# Patient Record
Sex: FEMALE | Race: BLACK OR AFRICAN AMERICAN | Employment: UNEMPLOYED | URBAN - METROPOLITAN AREA
[De-identification: names, ages, dates, MRNs, and addresses within clinical notes are randomized per-mention and may not be internally consistent; named-entity substitution may affect disease eponyms.]

---

## 2017-02-06 ENCOUNTER — OFFICE VISIT (OUTPATIENT)
Dept: FAMILY MEDICINE CLINIC | Age: 47
End: 2017-02-06

## 2017-02-06 VITALS
SYSTOLIC BLOOD PRESSURE: 105 MMHG | HEART RATE: 98 BPM | TEMPERATURE: 97.5 F | WEIGHT: 165 LBS | DIASTOLIC BLOOD PRESSURE: 66 MMHG | BODY MASS INDEX: 25.9 KG/M2 | HEIGHT: 67 IN | RESPIRATION RATE: 12 BRPM | OXYGEN SATURATION: 98 %

## 2017-02-06 DIAGNOSIS — M54.50 CHRONIC MIDLINE LOW BACK PAIN WITHOUT SCIATICA: ICD-10-CM

## 2017-02-06 DIAGNOSIS — F31.9 BIPOLAR DEPRESSION (HCC): Chronic | ICD-10-CM

## 2017-02-06 DIAGNOSIS — D53.9 NUTRITIONAL ANEMIA: ICD-10-CM

## 2017-02-06 DIAGNOSIS — Z23 ENCOUNTER FOR IMMUNIZATION: ICD-10-CM

## 2017-02-06 DIAGNOSIS — E78.00 ELEVATED CHOLESTEROL: ICD-10-CM

## 2017-02-06 DIAGNOSIS — G89.29 CHRONIC MIDLINE LOW BACK PAIN WITHOUT SCIATICA: ICD-10-CM

## 2017-02-06 DIAGNOSIS — Z11.59 NEED FOR HEPATITIS C SCREENING TEST: ICD-10-CM

## 2017-02-06 DIAGNOSIS — N92.1 METRORRHAGIA: ICD-10-CM

## 2017-02-06 DIAGNOSIS — E55.9 VITAMIN D DEFICIENCY: ICD-10-CM

## 2017-02-06 DIAGNOSIS — F31.9 BIPOLAR DEPRESSION (HCC): Primary | Chronic | ICD-10-CM

## 2017-02-06 DIAGNOSIS — R79.9 ABNORMAL FINDING OF BLOOD CHEMISTRY: ICD-10-CM

## 2017-02-06 RX ORDER — LANOLIN ALCOHOL/MO/W.PET/CERES
CREAM (GRAM) TOPICAL
COMMUNITY
End: 2017-02-14 | Stop reason: SDUPTHER

## 2017-02-06 RX ORDER — ALPRAZOLAM 1 MG/1
TABLET ORAL
COMMUNITY
End: 2017-06-05 | Stop reason: ALTCHOICE

## 2017-02-06 RX ORDER — QUETIAPINE FUMARATE 100 MG/1
50 TABLET, FILM COATED ORAL 2 TIMES DAILY
COMMUNITY
End: 2017-02-06 | Stop reason: SDUPTHER

## 2017-02-06 RX ORDER — QUETIAPINE FUMARATE 100 MG/1
TABLET, FILM COATED ORAL
Qty: 90 TAB | Refills: 5 | Status: SHIPPED | OUTPATIENT
Start: 2017-02-06 | End: 2017-02-06 | Stop reason: SDUPTHER

## 2017-02-06 RX ORDER — MEDROXYPROGESTERONE ACETATE 10 MG/1
TABLET ORAL DAILY
COMMUNITY
End: 2017-06-05 | Stop reason: ALTCHOICE

## 2017-02-06 RX ORDER — ARIPIPRAZOLE 20 MG/1
TABLET ORAL
Refills: 5 | COMMUNITY
Start: 2016-12-27 | End: 2018-02-13 | Stop reason: SDUPTHER

## 2017-02-06 RX ORDER — FLUCONAZOLE 150 MG/1
150 TABLET ORAL DAILY
Qty: 2 TAB | Refills: 0 | Status: SHIPPED | OUTPATIENT
Start: 2017-02-06 | End: 2017-06-05 | Stop reason: ALTCHOICE

## 2017-02-06 RX ORDER — FLUCONAZOLE 150 MG/1
150 TABLET ORAL DAILY
COMMUNITY
End: 2017-06-05 | Stop reason: ALTCHOICE

## 2017-02-06 NOTE — PROGRESS NOTES
np   Would like a psych referral, back specialist referral for med and surgery,   Would also like to have her cholesterol checked. Also reports heavy menstrual bleeding, clots and long periods. Pt reports that she would like to see a back specialist, thinks that she needs back surgery. Pt has been to specialists in the past for pain. Pt has had back injections in the past, pt reports that she is currently taking over the counter medications. Pt reports that she has been on hormone and other medication to help control menstrual bleeding. Pt reports multiple ER visit due to symptoms. Subjective: (As above and below)     Chief Complaint   Patient presents with    Other     specailist referrals    Irregular Menses    Labs    Medication Refill     she is a 48y.o. year old female who presents for evaluation. Reviewed PmHx, RxHx, FmHx, SocHx, AllgHx and updated in chart. Review of Systems - negative except as listed above    Objective:     Vitals:    02/06/17 1811   BP: 105/66   Pulse: 98   Resp: 12   Temp: 97.5 °F (36.4 °C)   SpO2: 98%   Weight: 165 lb (74.8 kg)   Height: 5' 7\" (1.702 m)     Physical Examination: General appearance - alert, well appearing, and in no distress  Mental status - normal mood, behavior, speech, dress, motor activity, and thought processes  Eyes - pupils equal and reactive, extraocular eye movements intact  Ears - bilateral TM's and external ear canals normal  Mouth - mucous membranes moist, pharynx normal without lesions  Chest - clear to auscultation, no wheezes, rales or rhonchi, symmetric air entry  Heart - normal rate, regular rhythm, normal S1, S2, no murmurs, rubs, clicks or gallops  Musculoskeletal - no joint tenderness, deformity or swelling, full range of motion without pain  Extremities - peripheral pulses normal, no pedal edema, no clubbing or cyanosis    Assessment/ Plan:   1.  Excessive menstruation at puberty  -refer for evaluation of ongoing symptoms  - REFERRAL TO OBSTETRICS AND GYNECOLOGY    2. Bipolar depression (HCC)  -refill seroquel  - QUEtiapine (SEROQUEL) 100 mg tablet; Please take two tabs each morning and one tab each evening  Dispense: 90 Tab; Refill: 5  - REFERRAL TO PSYCHIATRY    3. Chronic midline low back pain without sciatica  -refer to pain management, advised that records were needed prior to surgical eval  - REFERRAL TO PAIN MANAGEMENT     No Xanax refills until records are received. Follow-up Disposition: As needed  I have discussed the diagnosis with the patient and the intended plan as seen in the above orders. The patient has received an after-visit summary and questions were answered concerning future plans.      Medication Side Effects and Warnings were discussed with patient: yes  Patient Labs were reviewed: yes  Patient Past Records were reviewed:  yes    Misael Faulkner M.D.

## 2017-02-06 NOTE — PROGRESS NOTES
np   Would like a psych referral, back specialist referral for med and surgery,   Would also like to have her cholesterol checked. Also reports heavy menstrual bleeding, clots and long periods.

## 2017-02-06 NOTE — MR AVS SNAPSHOT
Visit Information Date & Time Provider Department Dept. Phone Encounter #  
 2/6/2017  5:40 PM Rowena Yeager MD [address] [facility] [phone] [id number] Upcoming Health Maintenance Date Due Pneumococcal 19-64 Medium Risk (1 of 1 - PPSV23) 2/15/1987 DTaP/Tdap/Td series (1 - Tdap) 2/15/1989 PAP AKA CERVICAL CYTOLOGY 2/15/1989 INFLUENZA AGE 9 TO ADULT 8/1/2016 Allergies as of 2/6/2017  Review Complete On: 2/6/2017 By: Rowena Yeager MD  
 No Known Allergies Current Immunizations  Never Reviewed No immunizations on file. Not reviewed this visit You Were Diagnosed With   
  
 Codes Comments Excessive menstruation at puberty    -  Primary ICD-10-CM: N92.2 ICD-9-CM: 626.3 [facility]     ICD-10-CM: F31.30 ICD-9-CM: 296.50 Chronic midline low back pain without sciatica     ICD-10-CM: M54.5, G89.29 ICD-9-CM: 724.2, 338.29 Vitals BP Pulse Temp Resp Height(growth percentile) Weight(growth percentile) 105/66 98 97.5 °F (36.4 °C) 12 5' 7\" (1.702 m) 165 lb (74.8 kg) LMP SpO2 BMI OB Status Smoking Status 02/06/2017 (Approximate) 98% 25.84 kg/m2 Unknown Current Every Day Smoker Vitals History BMI and BSA Data Body Mass Index Body Surface Area  
 25.84 kg/m 2 1.88 m 2 Preferred Pharmacy Pharmacy Name Phone [facility] [address] [place] [address], [address] [place] [address] [phone] Your Updated Medication List  
  
   
This list is accurate as of: 2/6/17  6:33 PM.  Always use your most recent med list.  
  
  
  
  
 ALPRAZolam 1 mg tablet Commonly known as:  Ace Sa Take  by mouth. ARIPiprazole 20 mg tablet Commonly known as:  ABILIFY TK 1 T PO QD  
  
 ferrous sulfate 325 mg (65 mg iron) tablet Take  by mouth Daily (before breakfast). * fluconazole 150 mg tablet Commonly known as:  DIFLUCAN  
 Take 150 mg by mouth daily. FDA advises cautious prescribing of oral fluconazole in pregnancy. * fluconazole 150 mg tablet Commonly known as:  DIFLUCAN Take 1 Tab by mouth daily. Repeat in 3 days if needed. medroxyPROGESTERone 10 mg tablet Commonly known as:  PROVERA Take  by mouth daily. QUEtiapine 100 mg tablet Commonly known as:  SEROquel Please take two tabs each morning and one tab each evening TRANEXAMIC ACID (BULK)  
by Does Not Apply route. * Notice: This list has 2 medication(s) that are the same as other medications prescribed for you. Read the directions carefully, and ask your doctor or other care provider to review them with you. Prescriptions Sent to Pharmacy Refills  
 fluconazole (DIFLUCAN) 150 mg tablet 0 Sig: Take 1 Tab by mouth daily. Repeat in 3 days if needed. Class: Normal  
 Pharmacy: [facility] [address] [place] [address] [address] [facility] [address] Ph #: [phone] Route: Oral  
 QUEtiapine (SEROQUEL) 100 mg tablet 5 Sig: Please take two tabs each morning and one tab each evening Class: Normal  
 Pharmacy: [facility] [address] [place], [place] [address]y [address] [place] [address] E Ph #: [phone] We Performed the Following REFERRAL TO OBSTETRICS AND GYNECOLOGY [REF51 Custom] REFERRAL TO PAIN MANAGEMENT [KQP019 Custom] Comments:  
 Please evaluate patient for back pain. REFERRAL TO PSYCHIATRY [REF91 Custom] Referral Information Referral ID Referred By Referred To  
  
 [id number] Rebecca Moffett MD   
   [id number] [facility] include 234 beds at the [facility], [facility] [address] [facility] Phone: [phone] Fax: [phone] Visits Status Start Date End Date 1 New Request 2/6/17 2/6/18  If your referral has a status of pending review or denied, additional information will be sent to support the outcome of this decision. Referral ID Referred By Referred To  
 [id number] Ashley ENRIQUEZ [facility] [address] [facility] Visits Status Start Date End Date 1 New Request 2/6/17 2/6/18 If your referral has a status of pending review or denied, additional information will be sent to support the outcome of this decision. Referral ID Referred By Referred To  
 [id number] Claudia JENKINS Service, [address] Phone: [phone] Fax: [phone] Visits Status Start Date End Date 1 New Request 2/6/17 2/6/18 If your referral has a status of pending review or denied, additional information will be sent to support the outcome of this decision. Introducing [facility] SERVICES! Paula Mayer introduces CraigsBlueBook patient portal. Now you can access parts of your medical record, email your doctor's office, and request medication refills online. 1. In your internet browser, go to [email or web address] 2. Click on the First Time User? Click Here link in the Sign In box. You will see the New Member Sign Up page. 3. Enter your CraigsBlueBook Access Code exactly as it appears below. You will not need to use this code after youve completed the sign-up process. If you do not sign up before the expiration date, you must request a new code. · CraigsBlueBook Access Code: [email or web address] Expires: 5/7/2017  6:33 PM 
 
4. Enter the last four digits of your Social Security Number (xxxx) and Date of Birth (mm/dd/yyyy) as indicated and click Submit. You will be taken to the next sign-up page. 5. Create a CraigsBlueBook ID. This will be your CraigsBlueBook login ID and cannot be changed, so think of one that is secure and easy to remember. 6. Create a InfluxDB password. You can change your password at any time. 7. Enter your Password Reset Question and Answer. This can be used at a later time if you forget your password. 8. Enter your e-mail address. You will receive e-mail notification when new information is available in [address] 9. Click Sign Up. You can now view and download portions of your medical record. 10. Click the Download Summary menu link to download a portable copy of your medical information. If you have questions, please visit the Frequently Asked Questions section of the InfluxDB website. Remember, InfluxDB is NOT to be used for urgent needs. For medical emergencies, dial 911. Now available from your iPhone and Android! Please provide this summary of care documentation to your next provider. Your primary care clinician is listed as Mary Carmen Lopez. If you have any questions after today's visit, please call [phone].

## 2017-02-07 RX ORDER — QUETIAPINE FUMARATE 100 MG/1
TABLET, FILM COATED ORAL
Qty: 270 TAB | Refills: 5 | Status: SHIPPED | OUTPATIENT
Start: 2017-02-07 | End: 2018-02-13 | Stop reason: SDUPTHER

## 2017-02-14 RX ORDER — LANOLIN ALCOHOL/MO/W.PET/CERES
325 CREAM (GRAM) TOPICAL
Qty: 30 TAB | Refills: 5 | Status: SHIPPED | OUTPATIENT
Start: 2017-02-14 | End: 2017-06-05 | Stop reason: ALTCHOICE

## 2017-06-05 ENCOUNTER — OFFICE VISIT (OUTPATIENT)
Dept: FAMILY MEDICINE CLINIC | Age: 47
End: 2017-06-05

## 2017-06-05 VITALS
TEMPERATURE: 98.4 F | HEART RATE: 84 BPM | DIASTOLIC BLOOD PRESSURE: 84 MMHG | HEIGHT: 67 IN | SYSTOLIC BLOOD PRESSURE: 118 MMHG | OXYGEN SATURATION: 98 % | BODY MASS INDEX: 25.18 KG/M2 | RESPIRATION RATE: 16 BRPM | WEIGHT: 160.4 LBS

## 2017-06-05 DIAGNOSIS — R79.9 ABNORMAL FINDING OF BLOOD CHEMISTRY: ICD-10-CM

## 2017-06-05 DIAGNOSIS — E78.00 HIGH CHOLESTEROL: ICD-10-CM

## 2017-06-05 DIAGNOSIS — R25.2 CRAMP OF BOTH LOWER EXTREMITIES: ICD-10-CM

## 2017-06-05 DIAGNOSIS — Z12.31 SCREENING MAMMOGRAM, ENCOUNTER FOR: ICD-10-CM

## 2017-06-05 DIAGNOSIS — E55.9 VITAMIN D DEFICIENCY: ICD-10-CM

## 2017-06-05 DIAGNOSIS — R82.90 FOUL SMELLING URINE: Primary | ICD-10-CM

## 2017-06-05 DIAGNOSIS — Z00.00 ROUTINE GENERAL MEDICAL EXAMINATION AT A HEALTH CARE FACILITY: ICD-10-CM

## 2017-06-05 LAB
BILIRUB UR QL STRIP: NORMAL
GLUCOSE UR-MCNC: NEGATIVE MG/DL
KETONES P FAST UR STRIP-MCNC: NEGATIVE MG/DL
PH UR STRIP: 5.5 [PH] (ref 4.6–8)
PROT UR QL STRIP: NORMAL MG/DL
SP GR UR STRIP: 1.03 (ref 1–1.03)
UA UROBILINOGEN AMB POC: NORMAL (ref 0.2–1)
URINALYSIS CLARITY POC: CLEAR
URINALYSIS COLOR POC: YELLOW
URINE BLOOD POC: NORMAL
URINE LEUKOCYTES POC: NEGATIVE
URINE NITRITES POC: NEGATIVE

## 2017-06-05 RX ORDER — TRAZODONE HYDROCHLORIDE 50 MG/1
50 TABLET ORAL
COMMUNITY
End: 2018-07-31 | Stop reason: SDUPTHER

## 2017-06-05 RX ORDER — CALCITRIOL 0.5 UG/1
0.5 CAPSULE ORAL DAILY
COMMUNITY
End: 2018-07-31 | Stop reason: SDUPTHER

## 2017-06-05 RX ORDER — NITROFURANTOIN 25; 75 MG/1; MG/1
100 CAPSULE ORAL 2 TIMES DAILY
Qty: 14 CAP | Refills: 0 | Status: SHIPPED | OUTPATIENT
Start: 2017-06-05 | End: 2017-06-12

## 2017-06-05 RX ORDER — TOPIRAMATE 25 MG/1
25 TABLET ORAL
COMMUNITY
End: 2018-07-31 | Stop reason: SDUPTHER

## 2017-06-05 NOTE — MR AVS SNAPSHOT
Visit Information Date & Time Provider Department Dept. Phone Encounter #  
 6/5/2017  9:20 AM Gladys Baum MD 5900 [facility] [phone] [id number] Upcoming Health Maintenance Date Due DTaP/Tdap/Td series (1 - Tdap) 2/15/1989 INFLUENZA AGE 9 TO ADULT 8/1/2017 PAP AKA CERVICAL CYTOLOGY 5/26/2020 Allergies as of 6/5/2017  Review Complete On: 6/5/2017 By: Gladys Baum MD  
 No Known Allergies Current Immunizations  Reviewed on 2/6/2017 Name Date Influenza Vaccine (Quad) PF 2/6/2017 Not reviewed this visit You Were Diagnosed With   
  
 Codes Comments Foul smelling urine    -  Primary ICD-10-CM: R82.90 ICD-9-CM: 791.9 Cramp of both lower extremities     ICD-10-CM: R25.2 ICD-9-CM: 729.82 High cholesterol     ICD-10-CM: E78.00 ICD-9-CM: 272.0 Vitamin D deficiency     ICD-10-CM: E55.9 ICD-9-CM: 268.9 Abnormal finding of blood chemistry     ICD-10-CM: R79.9 ICD-9-CM: 790.6 Routine general medical examination at a health care facility     ICD-10-CM: Z00.00 ICD-9-CM: V70.0 Screening mammogram, encounter for     ICD-10-CM: Z12.31 
ICD-9-CM: V76.12 Vitals BP Pulse Temp Resp Height(growth percentile) Weight(growth percentile) 118/84 (BP 1 Location: Left arm, BP Patient Position: Sitting) 84 98.4 °F (36.9 °C) (Oral) 16 5' 7\" (1.702 m) 160 lb 6.4 oz (72.8 kg) SpO2 BMI OB Status Smoking Status 98% 25.12 kg/m2 Unknown Current Every Day Smoker Vitals History BMI and BSA Data Body Mass Index Body Surface Area  
 25.12 kg/m 2 1.86 m 2 Preferred Pharmacy Pharmacy Name Phone [address] [phone] Your Updated Medication List  
  
   
This list is accurate as of: 6/5/17 10:27 AM.  Always use your most recent med list.  
  
  
  
  
 ARIPiprazole 20 mg tablet Commonly known as:  ABILIFY TK 1 T PO QD  
  
 calcitRIOL 0.5 mcg capsule Commonly known as:  ROCALTROL Take 0.5 mcg by mouth daily. nitrofurantoin (macrocrystal-monohydrate) 100 mg capsule Commonly known as:  MACROBID Take 1 Cap by mouth two (2) times a day for 7 days. QUEtiapine 100 mg tablet Commonly known as:  SEROquel TAKE 2 TABLETS BY MOUTH EVERY MORNING AND TAKE 1 TABLET BY MOUTH EVERY EVENING  
  
 topiramate 25 mg tablet Commonly known as:  TOPAMAX Take 25 mg by mouth nightly. traZODone 50 mg tablet Commonly known as:  Belarusian Never Take 50 mg by mouth nightly. Prescriptions Sent to Pharmacy Refills  
 nitrofurantoin, macrocrystal-monohydrate, (MACROBID) 100 mg capsule 0 Sig: Take 1 Cap by mouth two (2) times a day for 7 days. Class: Normal  
 Pharmacy: [place] [address] [facility], [address] [facility] [place] Ph #: [phone] Route: Oral  
  
We Performed the Following AMB POC URINALYSIS DIP STICK AUTO W/O MICRO [46458 CPT(R)] CBC WITH AUTOMATED DIFF [98807 CPT(R)] HEMOGLOBIN A1C WITH EAG [93330 CPT(R)] LIPID PANEL [38134 CPT(R)] METABOLIC PANEL, COMPREHENSIVE [90294 CPT(R)] TSH 3RD GENERATION [21452 CPT(R)] VITAMIN D, 25 HYDROXY [id number] CPT(R)] To-Do List   
 06/05/2017 Imaging:  [facility] MAMMO BI SCREENING INCL CAD Introducing [facility] HEALTH SERVICES! Jessica Bowman introduces Yozio patient portal. Now you can access parts of your medical record, email your doctor's office, and request medication refills online. 1. In your internet browser, go to [email or web address] 2. Click on the First Time User? Click Here link in the Sign In box. You will see the New Member Sign Up page. 3. Enter your Yozio Access Code exactly as it appears below.  You will not need to use this code after youve completed the sign-up process. If you do not sign up before the expiration date, you must request a new code. · The Crowd Works Access Code: [email or web address] Expires: 9/3/2017  9:37 AM 
 
4. Enter the last four digits of your Social Security Number (xxxx) and Date of Birth (mm/dd/yyyy) as indicated and click Submit. You will be taken to the next sign-up page. 5. Create a The Crowd Works ID. This will be your [employer] Crow[employer] Works login ID and cannot be changed, so think of one that is secure and easy to remember. 6. Create a [employer] Crowd Works password. You can change your password at any time. 7. Enter your Password Reset Question and Answer. This can be used at a later time if you forget your password. 8. Enter your e-mail address. You will receive e-mail notification when new information is available in [address] 9. Click Sign Up. You can now view and download portions of your medical record. 10. Click the Download Summary menu link to download a portable copy of your medical information. If you have questions, please visit the Frequently Asked Questions section of the The Crowd Works website. Remember, The Crow[employer] Works is NOT to be used for urgent needs. For medical emergencies, dial 911. Now available from your iPhone and Android! Please provide this summary of care documentation to your next provider. Your primary care clinician is listed as Suzanne Galaviz. If you have any questions after today's visit, please call [phone].

## 2017-06-05 NOTE — LETTER
6/21/2017 5:23 PM 
 
Ms. Ana Laura Montalvo [phone] Dear Ana Laura Montalvo: Please find your most recent results below. Resulted Orders AMB POC URINALYSIS DIP STICK AUTO W/O MICRO Result Value Ref Range Color (UA POC) Yellow Clarity (UA POC) Clear Glucose (UA POC) Negative Negative Bilirubin (UA POC) 1+ Negative Ketones (UA POC) Negative Negative Specific gravity (UA POC) 1.030 1.001 - 1.035 Blood (UA POC) 2+ Negative pH (UA POC) 5.5 4.6 - 8.0 Protein (UA POC) 1+ Negative mg/dL Urobilinogen (UA POC) 1 mg/dL 0.2 - 1 Nitrites (UA POC) Negative Negative Leukocyte esterase (UA POC) Negative Negative CBC WITH AUTOMATED DIFF Result Value Ref Range WBC 5.4 3.4 - 10.8 x10E3/uL  
 RBC 3.70 (L) 3.77 - 5.28 x10E6/uL HGB 12.0 11.1 - 15.9 g/dL HCT 36.4 34.0 - 46.6 % MCV 98 (H) 79 - 97 fL  
 MCH 32.4 26.6 - 33.0 pg  
 MCHC 33.0 31.5 - 35.7 g/dL  
 RDW 13.5 12.3 - 15.4 % PLATELET 760 843 - 240 x10E3/uL NEUTROPHILS 68 % Lymphocytes 24 % MONOCYTES 7 % EOSINOPHILS 1 % BASOPHILS 0 %  
 ABS. NEUTROPHILS 3.7 1.4 - 7.0 x10E3/uL Abs Lymphocytes 1.3 0.7 - 3.1 x10E3/uL  
 ABS. MONOCYTES 0.4 0.1 - 0.9 x10E3/uL  
 ABS. EOSINOPHILS 0.0 0.0 - 0.4 x10E3/uL  
 ABS. BASOPHILS 0.0 0.0 - 0.2 x10E3/uL IMMATURE GRANULOCYTES 0 %  
 ABS. IMM. GRANS. 0.0 0.0 - 0.1 x10E3/uL Narrative Performed at:  [address]  [id number] : Sara Miranda MD, Phone:  [phone] LIPID PANEL Result Value Ref Range Cholesterol, total 234 (H) 100 - 199 mg/dL Triglyceride 147 0 - 149 mg/dL HDL Cholesterol 61 >39 mg/dL VLDL, calculated 29 5 - 40 mg/dL LDL, calculated 144 (H) 0 - 99 mg/dL Narrative Performed at:  [address]  [id number] : Sara Miranda MD, Phone:  [phone] METABOLIC PANEL, COMPREHENSIVE  
 Result Value Ref Range Glucose 90 65 - 99 mg/dL BUN 13 6 - 24 mg/dL Creatinine 1.15 (H) 0.57 - 1.00 mg/dL GFR est non-AA 56 (L) >59 mL/min/1.73 GFR est AA 65 >59 mL/min/1.73  
 BUN/Creatinine ratio 11 9 - 23 Sodium 144 134 - 144 mmol/L Potassium 3.6 3.5 - 5.2 mmol/L Chloride 104 96 - 106 mmol/L  
 CO2 20 18 - 29 mmol/L Calcium 9.4 8.7 - 10.2 mg/dL Protein, total 7.0 6.0 - 8.5 g/dL Albumin 4.3 3.5 - 5.5 g/dL GLOBULIN, TOTAL 2.7 1.5 - 4.5 g/dL A-G Ratio 1.6 1.2 - 2.2 Bilirubin, total 0.3 0.0 - 1.2 mg/dL Alk. phosphatase 58 39 - 117 IU/L  
 AST (SGOT) 14 0 - 40 IU/L  
 ALT (SGPT) 14 0 - 32 IU/L Narrative Performed at:  [address]  [id number] : Juan Luis Galvan MD, Phone:  [phone] TSH 3RD GENERATION Result Value Ref Range TSH 1.270 0.450 - 4.500 uIU/mL Narrative Performed at:  [address]  [id number] : Juan Luis Galvan MD, Phone:  [phone] VITAMIN D, 25 HYDROXY Result Value Ref Range VITAMIN D, 25-HYDROXY 15.1 (L) 30.0 - 100.0 ng/mL Comment:  
   Vitamin D deficiency has been defined by the [facility] practice guideline as a 
level of serum 25-OH vitamin D less than 20 ng/mL (1,2). The Endocrine Society went on to further define vitamin D 
insufficiency as a level between 21 and 29 ng/mL (2). 1. IOM ([place] of Medicine). 2010. Dietary reference 
   intakes for calcium and D. [facility] 
   [employer]. 2. Farideh MF, Alyssa NC, Jc ARAIZA, et al. 
   Evaluation, treatment, and prevention of vitamin D 
   deficiency: an Endocrine Society clinical practice 
   guideline. JCEM. 2011 Jul; 96(7):1911-30. Narrative Performed at:  [address]  [id number] : Gifty Luke MD, Phone:  [phone] HEMOGLOBIN A1C WITH EAG Result Value Ref Range Hemoglobin A1c 5.4 4.8 - 5.6 % Comment:  
            Pre-diabetes: 5.7 - 6.4 Diabetes: >6.4 Glycemic control for adults with diabetes: <7.0 Estimated average glucose 108 mg/dL Narrative Performed at:  [address]  [id number] : Gifty Luke MD, Phone:  [phone] CVD REPORT Result Value Ref Range INTERPRETATION NTAP   
 PDF IMAGE Not applicable Narrative Performed at:  [address] [place] [address]  [id number] : Mariela Core PhD, Phone:  [phone] CKD REPORT Result Value Ref Range Interpretation Note Comment:  
   Supplement report is available. Narrative Performed at:  [address] [place] [address]  [id number] : Mariela Olivo PhD, Phone:  [phone] Please call me if you have any questions: [phone] Sincerely, Belinda Conti MD

## 2017-06-05 NOTE — PROGRESS NOTES
Patient seen in the office today for c/o of a possible bladder infection. She reports her urine has a odor. She denies pain on urination, or inability to empty her bladder. Patient is requesting a referral for a mammogram.    Pt is scheduled for an MRI tonight, is working with pain management, recently had injections in both hips. Helped on the right but not on the left. This is a Subsequent Medicare Annual Wellness Visit providing Personalized Prevention Plan Services (PPPS) (Performed 12 months after initial AWV and PPPS )    I have reviewed the patient's medical history in detail and updated the computerized patient record. History     Past Medical History:   Diagnosis Date    Bipolar affect, depressed ([facility]     Chronic back pain     Degenerative disc disease, lumbar     Lumbar radiculopathy     Psychiatric disorder     Slipped cervical disc     Spinal stenosis     Lumbar spine    Spondylolisthesis       Past Surgical History:   Procedure Laterality Date    DELIVERY       FOOT/TOES SURGERY PROC UNLISTED      HX  SECTION       Current Outpatient Prescriptions   Medication Sig Dispense Refill    topiramate (TOPAMAX) 25 mg tablet Take 25 mg by mouth nightly.  traZODone (DESYREL) 50 mg tablet Take 50 mg by mouth nightly.  calcitRIOL (ROCALTROL) 0.5 mcg capsule Take 0.5 mcg by mouth daily.  QUEtiapine (SEROQUEL) 100 mg tablet TAKE 2 TABLETS BY MOUTH EVERY MORNING AND TAKE 1 TABLET BY MOUTH EVERY EVENING 270 Tab 5    ARIPiprazole (ABILIFY) 20 mg tablet TK 1 T PO QD  5     No Known Allergies  History reviewed. No pertinent family history. Social History   Substance Use Topics    Smoking status: Current Every Day Smoker    Smokeless tobacco: Never Used    Alcohol use Yes     Patient Active Problem List   Diagnosis Code    Bipolar depression ([facility] F31.30       Depression Risk Factor Screening:   No flowsheet data found.   Alcohol Risk Factor Screening:   Pt drink rarely  Functional Ability and Level of Safety:     Hearing Loss   normal-to-mild    Activities of Daily Living   Self-care. Requires assistance with: no ADLs    Fall Risk   No flowsheet data found. Abuse Screen   Patient is not abused    Review of Systems   A comprehensive review of systems was negative except for that written in the HPI. Physical Examination     Evaluation of Cognitive Function:  Mood/affect:  neutral  Appearance: age appropriate  Family member/caregiver input: None present    Visit Vitals    /84 (BP 1 Location: Left arm, BP Patient Position: Sitting)    Pulse 84    Temp 98.4 °F (36.9 °C) (Oral)    Resp 16    Ht 5' 7\" (1.702 m)    Wt 160 lb 6.4 oz (72.8 kg)    SpO2 98%    BMI 25.12 kg/m2     General appearance: alert, cooperative, no distress, appears stated age  Head: Normocephalic, without obvious abnormality, atraumatic  Throat: Lips, mucosa, and tongue normal. Teeth and gums normal  Lungs: clear to auscultation bilaterally  Heart: regular rate and rhythm, S1, S2 normal, no murmur, click, rub or gallop  Extremities: extremities normal, atraumatic, no cyanosis or edema    Patient Care Team:  Yarelis Talavera MD as PCP - General    Advice/Referrals/Counseling   Education and counseling provided:  Screening Mammography      Assessment/Plan       ICD-10-CM ICD-9-CM    1. Foul smelling urine R82.90 791.9 AMB POC URINALYSIS DIP STICK AUTO W/O MICRO   2. Cramp of both lower extremities R25.2 729.82    3. High cholesterol E78.00 272.0 CBC WITH AUTOMATED DIFF      LIPID PANEL      METABOLIC PANEL, COMPREHENSIVE      TSH 3RD GENERATION      VITAMIN D, 25 HYDROXY      HEMOGLOBIN A1C WITH EAG   4. Vitamin D deficiency  E55.9 268.9 VITAMIN D, 25 HYDROXY   5. Abnormal finding of blood chemistry  R79.9 790.6 HEMOGLOBIN A1C WITH EAG   6. Routine general medical examination at a health care facility Z00.00 V70.0    7.  Screening mammogram, encounter for Z12.31 V76.12 [facility] MAMMO BI SCREENING INCL CAD     Encounter Diagnoses   Name Primary?  Foul smelling urine Yes    Cramp of both lower extremities     High cholesterol     Vitamin D deficiency      Abnormal finding of blood chemistry      Routine general medical examination at a health care facility     Screening mammogram, encounter for      Orders Placed This Encounter    NORTH MAMMO BI SCREENING INCL CAD    CBC WITH AUTOMATED DIFF    LIPID PANEL    METABOLIC PANEL, COMPREHENSIVE    TSH 3RD GENERATION    VITAMIN D, 25 HYDROXY    HEMOGLOBIN A1C WITH EAG    AMB POC URINALYSIS DIP STICK AUTO W/O MICRO    topiramate (TOPAMAX) 25 mg tablet    traZODone (DESYREL) 50 mg tablet    calcitRIOL (ROCALTROL) 0.5 mcg capsule    nitrofurantoin, macrocrystal-monohydrate, (MACROBID) 100 mg capsule   .

## 2017-06-05 NOTE — PROGRESS NOTES
Patient seen in the office today for c/o of a possible bladder infection. She reports her urine has a odor. She denies pain on urination, or inability to empty her bladder.  Patient is requesting a referral for a mammogram.

## 2017-06-07 NOTE — PROGRESS NOTES
Disregard last message. [phone] attempted to contact pt, unable to leave a VM due to mailbox being full.

## 2017-06-07 NOTE — PROGRESS NOTES
Cholesterol is elevated, please closely monitor diet and increase exercise, recheck in 6 months. Slight elevation in kidney function, recheck in one month  Vitamin D is slightly low, please take a daily supplement of at least 2000 IU (international units) daily. All other labs are within normal limits.    Please inform

## 2017-07-13 LAB
25(OH)D3+25(OH)D2 SERPL-MCNC: 15.1 NG/ML (ref 30–100)
ALBUMIN SERPL-MCNC: 4.3 G/DL (ref 3.5–5.5)
ALBUMIN/GLOB SERPL: 1.6 {RATIO} (ref 1.2–2.2)
ALP SERPL-CCNC: 58 IU/L (ref 39–117)
ALT SERPL-CCNC: 14 IU/L (ref 0–32)
AST SERPL-CCNC: 14 IU/L (ref 0–40)
BASOPHILS # BLD AUTO: 0 X10E3/UL (ref 0–0.2)
BASOPHILS NFR BLD AUTO: 0 %
BILIRUB SERPL-MCNC: 0.3 MG/DL (ref 0–1.2)
BUN SERPL-MCNC: 13 MG/DL (ref 6–24)
BUN/CREAT SERPL: 11 (ref 9–23)
CALCIUM SERPL-MCNC: 9.4 MG/DL (ref 8.7–10.2)
CHLORIDE SERPL-SCNC: 104 MMOL/L (ref 96–106)
CHOLEST SERPL-MCNC: 234 MG/DL (ref 100–199)
CO2 SERPL-SCNC: 20 MMOL/L (ref 18–29)
CREAT SERPL-MCNC: 1.15 MG/DL (ref 0.57–1)
EOSINOPHIL # BLD AUTO: 0 X10E3/UL (ref 0–0.4)
EOSINOPHIL NFR BLD AUTO: 1 %
ERYTHROCYTE [DISTWIDTH] IN BLOOD BY AUTOMATED COUNT: 13.5 % (ref 12.3–15.4)
EST. AVERAGE GLUCOSE BLD GHB EST-MCNC: 108 MG/DL
GLOBULIN SER CALC-MCNC: 2.7 G/DL (ref 1.5–4.5)
GLUCOSE SERPL-MCNC: 90 MG/DL (ref 65–99)
HBA1C MFR BLD: 5.4 % (ref 4.8–5.6)
HCT VFR BLD AUTO: 36.4 % (ref 34–46.6)
HDLC SERPL-MCNC: 61 MG/DL
HGB BLD-MCNC: 12 G/DL (ref 11.1–15.9)
IMM GRANULOCYTES # BLD: 0 X10E3/UL (ref 0–0.1)
IMM GRANULOCYTES NFR BLD: 0 %
INTERPRETATION, 910389: NORMAL
INTERPRETATION: NORMAL
LDLC SERPL CALC-MCNC: 144 MG/DL (ref 0–99)
LYMPHOCYTES # BLD AUTO: 1.3 X10E3/UL (ref 0.7–3.1)
LYMPHOCYTES NFR BLD AUTO: 24 %
MCH RBC QN AUTO: 32.4 PG (ref 26.6–33)
MCHC RBC AUTO-ENTMCNC: 33 G/DL (ref 31.5–35.7)
MCV RBC AUTO: 98 FL (ref 79–97)
MONOCYTES # BLD AUTO: 0.4 X10E3/UL (ref 0.1–0.9)
MONOCYTES NFR BLD AUTO: 7 %
NEUTROPHILS # BLD AUTO: 3.7 X10E3/UL (ref 1.4–7)
NEUTROPHILS NFR BLD AUTO: 68 %
PDF IMAGE, 910387: NORMAL
PLATELET # BLD AUTO: 274 X10E3/UL (ref 150–379)
POTASSIUM SERPL-SCNC: 3.6 MMOL/L (ref 3.5–5.2)
PROT SERPL-MCNC: 7 G/DL (ref 6–8.5)
RBC # BLD AUTO: 3.7 X10E6/UL (ref 3.77–5.28)
SODIUM SERPL-SCNC: 144 MMOL/L (ref 134–144)
TRIGL SERPL-MCNC: 147 MG/DL (ref 0–149)
TSH SERPL DL<=0.005 MIU/L-ACNC: 1.27 UIU/ML (ref 0.45–4.5)
VLDLC SERPL CALC-MCNC: 29 MG/DL (ref 5–40)
WBC # BLD AUTO: 5.4 X10E3/UL (ref 3.4–10.8)

## 2017-07-26 ENCOUNTER — DOCUMENTATION ONLY (OUTPATIENT)
Dept: FAMILY MEDICINE CLINIC | Age: 47
End: 2017-07-26

## 2017-07-26 NOTE — PROGRESS NOTES
[facility] request for Omega 3 capsules faxed back to [phone] and placed in scan folder

## 2017-07-31 ENCOUNTER — DOCUMENTATION ONLY (OUTPATIENT)
Dept: FAMILY MEDICINE CLINIC | Age: 47
End: 2017-07-31

## 2017-08-01 ENCOUNTER — TELEPHONE (OUTPATIENT)
Dept: FAMILY MEDICINE CLINIC | Age: 47
End: 2017-08-01

## 2017-08-01 NOTE — TELEPHONE ENCOUNTER
Attempted to reach pt to notify that she will need an appt to get lidocaine ointment rx. Pt's phone is not accepting VM.

## 2017-08-15 ENCOUNTER — DOCUMENTATION ONLY (OUTPATIENT)
Dept: FAMILY MEDICINE CLINIC | Age: 47
End: 2017-08-15

## 2017-08-15 NOTE — PROGRESS NOTES
[place] medicaid [place] for Topiramate completed and placed on Dr. Gonzalez Artist desk for signature then fax.

## 2017-08-17 ENCOUNTER — TELEPHONE (OUTPATIENT)
Dept: FAMILY MEDICINE CLINIC | Age: 47
End: 2017-08-17

## 2017-09-14 ENCOUNTER — OFFICE VISIT (OUTPATIENT)
Dept: FAMILY MEDICINE CLINIC | Age: 47
End: 2017-09-14

## 2017-09-14 VITALS
WEIGHT: 165 LBS | RESPIRATION RATE: 18 BRPM | HEART RATE: 83 BPM | BODY MASS INDEX: 25.9 KG/M2 | SYSTOLIC BLOOD PRESSURE: 103 MMHG | DIASTOLIC BLOOD PRESSURE: 66 MMHG | TEMPERATURE: 98.2 F | HEIGHT: 67 IN

## 2017-09-14 DIAGNOSIS — R30.0 DYSURIA: ICD-10-CM

## 2017-09-14 DIAGNOSIS — M54.5 LOW BACK PAIN, UNSPECIFIED BACK PAIN LATERALITY, UNSPECIFIED CHRONICITY, WITH SCIATICA PRESENCE UNSPECIFIED: Primary | ICD-10-CM

## 2017-09-14 RX ORDER — ACETAMINOPHEN AND CODEINE PHOSPHATE 120; 12 MG/5ML; MG/5ML
SOLUTION ORAL
COMMUNITY
End: 2019-01-22 | Stop reason: ALTCHOICE

## 2017-09-14 RX ORDER — NITROFURANTOIN 25; 75 MG/1; MG/1
100 CAPSULE ORAL 2 TIMES DAILY
Qty: 14 CAP | Refills: 0 | Status: SHIPPED | OUTPATIENT
Start: 2017-09-14 | End: 2017-09-21

## 2017-09-14 NOTE — MR AVS SNAPSHOT
Visit Information Date & Time Provider Department Dept. Phone Encounter #  
 9/14/2017 10:30 AM La Nena Burns MD [address] [facility] [phone] [id number] Upcoming Health Maintenance Date Due DTaP/Tdap/Td series (1 - Tdap) 2/15/1989 INFLUENZA AGE 9 TO ADULT 8/1/2017 PAP AKA CERVICAL CYTOLOGY 5/26/2020 Allergies as of 9/14/2017  Review Complete On: 9/14/2017 By: La Nena Burns MD  
 No Known Allergies Current Immunizations  Reviewed on 2/6/2017 Name Date Influenza Vaccine (Quad) PF 2/6/2017 Not reviewed this visit You Were Diagnosed With   
  
 Codes Comments Low back pain, unspecified back pain laterality, unspecified chronicity, with sciatica presence unspecified    -  Primary ICD-10-CM: M54.5 ICD-9-CM: 724.2 Dysuria     ICD-10-CM: R30.0 ICD-9-CM: 492. 1 Vitals BP Pulse Temp Resp Height(growth percentile) Weight(growth percentile) 103/66 (BP 1 Location: Right arm, BP Patient Position: Sitting) 83 98.2 °F (36.8 °C) (Oral) 18 5' 7\" (1.702 m) 165 lb (74.8 kg) BMI OB Status Smoking Status 25.84 kg/m2 Unknown Current Every Day Smoker Vitals History BMI and BSA Data Body Mass Index Body Surface Area  
 25.84 kg/m 2 1.88 m 2 Preferred Pharmacy Pharmacy Name Phone [facility]  [phone] Your Updated Medication List  
  
   
This list is accurate as of: 9/14/17 11:14 AM.  Always use your most recent med list.  
  
  
  
  
 ARIPiprazole 20 mg tablet Commonly known as:  ABILIFY TK 1 T PO QD  
  
 calcitRIOL 0.5 mcg capsule Commonly known as:  ROCALTROL Take 0.5 mcg by mouth daily. JOLIVETTE 0.35 mg Tab Generic drug:  norethindrone Take  by mouth. nitrofurantoin (macrocrystal-monohydrate) 100 mg capsule Commonly known as:  MACROBID  
 Take 1 Cap by mouth two (2) times a day for 7 days. QUEtiapine 100 mg tablet Commonly known as:  SEROquel TAKE 2 TABLETS BY MOUTH EVERY MORNING AND TAKE 1 TABLET BY MOUTH EVERY EVENING  
  
 topiramate 25 mg tablet Commonly known as:  TOPAMAX Take 25 mg by mouth nightly. traZODone 50 mg tablet Commonly known as:  Rico Harrier Take 50 mg by mouth nightly. Prescriptions Sent to Pharmacy Refills  
 nitrofurantoin, macrocrystal-monohydrate, (MACROBID) 100 mg capsule 0 Sig: Take 1 Cap by mouth two (2) times a day for 7 days. Class: Normal  
 [facility] [facility] [place] Ph #: [phone] Route: Oral  
  
We Performed the Following AMB POC URINALYSIS DIP STICK AUTO W/O MICRO [02909 CPT(R)] CBC WITH AUTOMATED DIFF [27305 CPT(R)] METABOLIC PANEL, COMPREHENSIVE [05700 CPT(R)] Introducing [facility] [place] & HEALTH SERVICES! Guadalupe Douglas introduces [employer] patient portal. Now you can access parts of your medical record, email your doctor's office, and request medication refills online. 1. In your internet browser, go to [email or web address] 2. Click on the First Time User? Click Here link in the Sign In box. You will see the New Member Sign Up page. 3. Enter your [employer] Access Code exactly as it appears below. You will not need to use this code after youve completed the sign-up process. If you do not sign up before the expiration date, you must request a new code. · [employer] Access Code: [email or web address] Expires: 12/13/2017 11:00 AM 
 
4. Enter the last four digits of your Social Security Number (xxxx) and Date of Birth (mm/dd/yyyy) as indicated and click Submit. You will be taken to the next sign-up page. 5. Create a [employer] ID. This will be your [employer] login ID and cannot be changed, so think of one that is secure and easy to remember. 6. Create a [employer] password. You can change your password at any time. 7. Enter your Password Reset Question and Answer. This can be used at a later time if you forget your password. 8. Enter your e-mail address. You will receive e-mail notification when new information is available in [address] 9. Click Sign Up. You can now view and download portions of your medical record. 10. Click the Download Summary menu link to download a portable copy of your medical information. If you have questions, please visit the Frequently Asked Questions section of the [employer] website. Remember, [employer] is NOT to be used for urgent needs. For medical emergencies, dial 911. Now available from your iPhone and Android! Please provide this summary of care documentation to your next provider. Your primary care clinician is listed as Margareth Goel. If you have any questions after today's visit, please call [phone].

## 2017-09-14 NOTE — PROGRESS NOTES
Pt here to follow up on elevated kidney function. Reports having urinary odor and ongoing lower back pain. Pt is unable provide a urine sample. Pt reports being on her menstrual cycle x 1 month, was started on birth control by her ob. Pt was recently evaluated for SOB by the ER, had a neg chest CT per pt report. Subjective: (As above and below)     Chief Complaint   Patient presents with    Back Pain    Urinary Odor     she is a 50y.o. year old female who presents for evaluation. Reviewed PmHx, RxHx, FmHx, SocHx, AllgHx and updated in chart. Review of Systems - negative except as listed above    Objective:     Vitals:    09/14/17 1055   BP: 103/66   Pulse: 83   Resp: 18   Temp: 98.2 °F (36.8 °C)   TempSrc: Oral   Weight: 165 lb (74.8 kg)   Height: 5' 7\" (1.702 m)     Physical Examination: General appearance - alert, well appearing, and in no distress  Mental status - normal mood, behavior, speech, dress, motor activity, and thought processes  Mouth - mucous membranes moist, pharynx normal without lesions  Chest - clear to auscultation, no wheezes, rales or rhonchi, symmetric air entry  Heart - normal rate, regular rhythm, normal S1, S2, no murmurs, rubs, clicks or gallops  Musculoskeletal - no joint tenderness, deformity or swelling  Extremities - peripheral pulses normal, no pedal edema, no clubbing or cyanosis  No CVA tenderness, no abdominal pain  Assessment/ Plan:   1. Low back pain, unspecified back pain laterality, unspecified chronicity, with sciatica presence unspecified  -treat as UTI, recheck labs  - AMB POC URINALYSIS DIP STICK AUTO W/O MICRO    2. Dysuria  - CBC WITH AUTOMATED DIFF  - METABOLIC PANEL, COMPREHENSIVE     Follow-up Disposition: As needed  I have discussed the diagnosis with the patient and the intended plan as seen in the above orders. The patient has received an after-visit summary and questions were answered concerning future plans.      Medication Side Effects and Warnings were discussed with patient: yes  Patient Labs were reviewed: yes  Patient Past Records were reviewed:  yes    Arden Feng M.D.

## 2017-09-14 NOTE — PROGRESS NOTES
Pt here to follow up on elevated kidney function. Reports having urinary odor and ongoing lower back pain.

## 2017-09-15 LAB
ALBUMIN SERPL-MCNC: 4.1 G/DL (ref 3.5–5.5)
ALBUMIN/GLOB SERPL: 1.7 {RATIO} (ref 1.2–2.2)
ALP SERPL-CCNC: 49 IU/L (ref 39–117)
ALT SERPL-CCNC: 13 IU/L (ref 0–32)
AST SERPL-CCNC: 19 IU/L (ref 0–40)
BASOPHILS # BLD AUTO: 0 X10E3/UL (ref 0–0.2)
BASOPHILS NFR BLD AUTO: 0 %
BILIRUB SERPL-MCNC: <0.2 MG/DL (ref 0–1.2)
BUN SERPL-MCNC: 13 MG/DL (ref 6–24)
BUN/CREAT SERPL: 10 (ref 9–23)
CALCIUM SERPL-MCNC: 9.1 MG/DL (ref 8.7–10.2)
CHLORIDE SERPL-SCNC: 104 MMOL/L (ref 96–106)
CO2 SERPL-SCNC: 16 MMOL/L (ref 18–29)
CREAT SERPL-MCNC: 1.35 MG/DL (ref 0.57–1)
EOSINOPHIL # BLD AUTO: 0 X10E3/UL (ref 0–0.4)
EOSINOPHIL NFR BLD AUTO: 0 %
ERYTHROCYTE [DISTWIDTH] IN BLOOD BY AUTOMATED COUNT: 14.5 % (ref 12.3–15.4)
GLOBULIN SER CALC-MCNC: 2.4 G/DL (ref 1.5–4.5)
GLUCOSE SERPL-MCNC: 165 MG/DL (ref 65–99)
HCT VFR BLD AUTO: 24.7 % (ref 34–46.6)
HGB BLD-MCNC: 7.6 G/DL (ref 11.1–15.9)
IMM GRANULOCYTES # BLD: 0 X10E3/UL (ref 0–0.1)
IMM GRANULOCYTES NFR BLD: 0 %
INTERPRETATION: NORMAL
LYMPHOCYTES # BLD AUTO: 1 X10E3/UL (ref 0.7–3.1)
LYMPHOCYTES NFR BLD AUTO: 21 %
MCH RBC QN AUTO: 29.9 PG (ref 26.6–33)
MCHC RBC AUTO-ENTMCNC: 30.8 G/DL (ref 31.5–35.7)
MCV RBC AUTO: 97 FL (ref 79–97)
MONOCYTES # BLD AUTO: 0.3 X10E3/UL (ref 0.1–0.9)
MONOCYTES NFR BLD AUTO: 6 %
NEUTROPHILS # BLD AUTO: 3.6 X10E3/UL (ref 1.4–7)
NEUTROPHILS NFR BLD AUTO: 73 %
PLATELET # BLD AUTO: 419 X10E3/UL (ref 150–379)
POTASSIUM SERPL-SCNC: 4.4 MMOL/L (ref 3.5–5.2)
PROT SERPL-MCNC: 6.5 G/DL (ref 6–8.5)
RBC # BLD AUTO: 2.54 X10E6/UL (ref 3.77–5.28)
SODIUM SERPL-SCNC: 140 MMOL/L (ref 134–144)
WBC # BLD AUTO: 4.9 X10E3/UL (ref 3.4–10.8)

## 2017-09-16 NOTE — PROGRESS NOTES
Severe new onset anemia. Please ask if pt was told her hemoglobin by her OB. Kidney function slightly increased. Recheck in 2 weeks.    Please inform

## 2017-09-18 ENCOUNTER — TELEPHONE (OUTPATIENT)
Dept: FAMILY MEDICINE CLINIC | Age: 47
End: 2017-09-18

## 2017-09-18 NOTE — TELEPHONE ENCOUNTER
Patient is returning your phone call and is asking for a call back please at [phone]. Thank you.

## 2017-09-18 NOTE — PROGRESS NOTES
Patient informed of results and providers recommendations. Patient states she informed the provider she has been on her menses x's 1 month. She reports having a appt with ob on 9/21/17. No further questions or concerns voiced.

## 2017-09-18 NOTE — PROGRESS NOTES
Call placed to patient at [phone], voicemail states it is full. Writer unable to leave message. 1st Attempt.

## 2017-11-27 ENCOUNTER — OFFICE VISIT (OUTPATIENT)
Dept: FAMILY MEDICINE CLINIC | Age: 47
End: 2017-11-27

## 2017-11-27 VITALS
DIASTOLIC BLOOD PRESSURE: 60 MMHG | OXYGEN SATURATION: 99 % | HEIGHT: 67 IN | BODY MASS INDEX: 26.53 KG/M2 | SYSTOLIC BLOOD PRESSURE: 101 MMHG | WEIGHT: 169 LBS | TEMPERATURE: 98.1 F | RESPIRATION RATE: 16 BRPM | HEART RATE: 95 BPM

## 2017-11-27 DIAGNOSIS — R79.89 ELEVATED SERUM CREATININE: Primary | ICD-10-CM

## 2017-11-27 RX ORDER — LANOLIN ALCOHOL/MO/W.PET/CERES
CREAM (GRAM) TOPICAL
COMMUNITY
End: 2018-02-13 | Stop reason: SDUPTHER

## 2017-11-27 NOTE — MR AVS SNAPSHOT
Visit Information Date & Time Provider Department Dept. Phone Encounter #  
 11/27/2017  4:00 PM Lynn Flowers MD [address] [facility] [phone] [id number] Upcoming Health Maintenance Date Due DTaP/Tdap/Td series (1 - Tdap) 2/15/1989 PAP AKA CERVICAL CYTOLOGY 5/26/2020 Allergies as of 11/27/2017  Review Complete On: 11/27/2017 By: Lynn Flowers MD  
 No Known Allergies Current Immunizations  Reviewed on 2/6/2017 Name Date Influenza Vaccine (Quad) PF 2/6/2017 Not reviewed this visit You Were Diagnosed With   
  
 Codes Comments Elevated serum creatinine    -  Primary ICD-10-CM: R79.89 ICD-9-CM: 790.99 Vitals BP Pulse Temp Resp Height(growth percentile) Weight(growth percentile) 101/60 (BP 1 Location: Left arm, BP Patient Position: Sitting) 95 98.1 °F (36.7 °C) (Oral) 16 5' 7\" (1.702 m) 169 lb (76.7 kg) LMP SpO2 BMI OB Status Smoking Status (Exact Date) 99% 26.47 kg/m2 Ablation Current Every Day Smoker Vitals History BMI and BSA Data Body Mass Index Body Surface Area  
 26.47 kg/m 2 1.9 m 2 Preferred Pharmacy Pharmacy Name Phone [address] [phone] Your Updated Medication List  
  
   
This list is accurate as of: 11/27/17  4:53 PM.  Always use your most recent med list.  
  
  
  
  
 ARIPiprazole 20 mg tablet Commonly known as:  ABILIFY TK 1 T PO QD  
  
 calcitRIOL 0.5 mcg capsule Commonly known as:  ROCALTROL Take 0.5 mcg by mouth daily. ferrous sulfate 325 mg (65 mg iron) tablet Take  by mouth Daily (before breakfast). JOLIVETTE 0.35 mg Tab Generic drug:  norethindrone Take  by mouth. QUEtiapine 100 mg tablet Commonly known as:  SEROquel TAKE 2 TABLETS BY MOUTH EVERY MORNING AND TAKE 1 TABLET BY MOUTH EVERY EVENING  
  
 topiramate 25 mg tablet Commonly known as:  TOPAMAX Take 25 mg by mouth nightly. traZODone 50 mg tablet Commonly known as:  Saeed Gayla Take 50 mg by mouth nightly. We Performed the Following CBC WITH AUTOMATED DIFF [23750 CPT(R)] METABOLIC PANEL, COMPREHENSIVE [15862 CPT(R)] Introducing [facility] & HEALTH SERVICES! [employer] introduces [employer] patient portal. Now you can access parts of your medical record, email your doctor's office, and request medication refills online. 1. In your internet browser, go to [email or web address] 2. Click on the First Time User? Click Here link in the Sign In box. You will see the New Member Sign Up page. 3. Enter your [employer] Access Code exactly as it appears below. You will not need to use this code after youve completed the sign-up process. If you do not sign up before the expiration date, you must request a new code. · [employer] Access Code: [email or web address] Expires: 12/13/2017 10:00 AM 
 
4. Enter the last four digits of your Social Security Number (xxxx) and Date of Birth (mm/dd/yyyy) as indicated and click Submit. You will be taken to the next sign-up page. 5. Create a [employer] ID. This will be your [employer] login ID and cannot be changed, so think of one that is secure and easy to remember. 6. Create a [employer] password. You can change your password at any time. 7. Enter your Password Reset Question and Answer. This can be used at a later time if you forget your password. 8. Enter your e-mail address. You will receive e-mail notification when new information is available in [address] 9. Click Sign Up. You can now view and download portions of your medical record. 10. Click the Download Summary menu link to download a portable copy of your medical information. If you have questions, please visit the Frequently Asked Questions section of the [employer] website.  Remember, [employer] is NOT to be used for urgent needs. For medical emergencies, dial 911. Now available from your iPhone and Android! Please provide this summary of care documentation to your next provider. Your primary care clinician is listed as Amor Mosquead. If you have any questions after today's visit, please call [phone].

## 2017-11-27 NOTE — LETTER
12/5/2017 4:31 PM 
 
Ms. Viral Sawyer [address] [id number] [address] Dear Viral Sawyer: Please find your most recent results below. Resulted Orders METABOLIC PANEL, COMPREHENSIVE Result Value Ref Range Glucose 82 65 - 99 mg/dL BUN 17 6 - 24 mg/dL Creatinine 1.04 (H) 0.57 - 1.00 mg/dL GFR est non-AA 63 >59 mL/min/1.73 GFR est AA 73 >59 mL/min/1.73  
 BUN/Creatinine ratio 16 9 - 23 Sodium 141 134 - 144 mmol/L Potassium 4.8 3.5 - 5.2 mmol/L Chloride 102 96 - 106 mmol/L  
 CO2 19 18 - 29 mmol/L Calcium 9.6 8.7 - 10.2 mg/dL Protein, total 7.1 6.0 - 8.5 g/dL Albumin 4.1 3.5 - 5.5 g/dL GLOBULIN, TOTAL 3.0 1.5 - 4.5 g/dL A-G Ratio 1.4 1.2 - 2.2 Bilirubin, total <0.2 0.0 - 1.2 mg/dL Alk. phosphatase 68 39 - 117 IU/L  
 AST (SGOT) 23 0 - 40 IU/L  
 ALT (SGPT) 28 0 - 32 IU/L Narrative Performed at:  [address]  [id number] : Pancho Robins MD, Phone:  [phone] CBC WITH AUTOMATED DIFF Result Value Ref Range WBC 5.6 3.4 - 10.8 x10E3/uL  
 RBC 4.27 3.77 - 5.28 x10E6/uL HGB 12.8 11.1 - 15.9 g/dL Comment: **Effective December 4, 2017 the reference interval** 
  for Hemoglobin MALES only will be changing to: Males 13-15 years: 12.6 - 17.7 Males   >15 years: 13.0 - 17.7 HCT 38.0 34.0 - 46.6 % MCV 89 79 - 97 fL  
 MCH 30.0 26.6 - 33.0 pg  
 MCHC 33.7 31.5 - 35.7 g/dL  
 RDW 15.1 12.3 - 15.4 % PLATELET 036 383 - 051 x10E3/uL NEUTROPHILS 67 Not Estab. % Lymphocytes 25 Not Estab. % MONOCYTES 6 Not Estab. % EOSINOPHILS 2 Not Estab. % BASOPHILS 0 Not Estab. %  
 ABS. NEUTROPHILS 3.8 1.4 - 7.0 x10E3/uL Abs Lymphocytes 1.4 0.7 - 3.1 x10E3/uL  
 ABS. MONOCYTES 0.3 0.1 - 0.9 x10E3/uL  
 ABS. EOSINOPHILS 0.1 0.0 - 0.4 x10E3/uL  
 ABS. BASOPHILS 0.0 0.0 - 0.2 x10E3/uL  IMMATURE GRANULOCYTES 0 Not Estab. %  
 ABS. IMM. GRANS. 0.0 0.0 - 0.1 x10E3/uL Narrative Performed at:  [address]  [id number] : Rafat Anton MD, Phone:  [phone] RECOMMENDATIONS: 
 
 
Kidney function improved Blood counts normalized All other labs are normal 
 
 
Please call me if you have any questions: [phone] Sincerely, Rena Osullivan MD

## 2017-11-27 NOTE — PROGRESS NOTES
Pt here to follow up on increased kidney function. C/o chronic pain in back and legs. Pt had a Novasure ablation due to heavy bleeding and low hemoglobin. Pt had a recent MRI, has a follow up scheduled with her back specialist, Dr. Cole Tomlin. Subjective: (As above and below)     Chief Complaint   Patient presents with   [facility]     she is a 50y.o. year old female who presents for evaluation. Reviewed PmHx, RxHx, FmHx, SocHx, AllgHx and updated in chart. Review of Systems - negative except as listed above    Objective:     Vitals:    11/27/17 1618   BP: 101/60   Pulse: 95   Resp: 16   Temp: 98.1 °F (36.7 °C)   TempSrc: Oral   SpO2: 99%   Weight: 169 lb (76.7 kg)   Height: 5' 7\" (1.702 m)     Physical Examination: General appearance - alert, well appearing, and in no distress  Mental status - normal mood, behavior, speech, dress, motor activity, and thought processes  Mouth - mucous membranes moist, pharynx normal without lesions  Chest - clear to auscultation, no wheezes, rales or rhonchi, symmetric air entry  Heart - normal rate, regular rhythm, normal S1, S2, no murmurs, rubs, clicks or gallops  Musculoskeletal - no joint tenderness, deformity or swelling    Assessment/ Plan:   1. Elevated serum creatinine  -recheck today, improved when hospital records reviewed  - METABOLIC PANEL, COMPREHENSIVE  - CBC WITH AUTOMATED DIFF     Follow-up Disposition: As needed  I have discussed the diagnosis with the patient and the intended plan as seen in the above orders. The patient has received an after-visit summary and questions were answered concerning future plans.      Medication Side Effects and Warnings were discussed with patient: yes  Patient Labs were reviewed: yes  Patient Past Records were reviewed:  yes    Kiran Schwartz M.D.

## 2017-11-28 LAB
ALBUMIN SERPL-MCNC: 4.1 G/DL (ref 3.5–5.5)
ALBUMIN/GLOB SERPL: 1.4 {RATIO} (ref 1.2–2.2)
ALP SERPL-CCNC: 68 IU/L (ref 39–117)
ALT SERPL-CCNC: 28 IU/L (ref 0–32)
AST SERPL-CCNC: 23 IU/L (ref 0–40)
BASOPHILS # BLD AUTO: 0 X10E3/UL (ref 0–0.2)
BASOPHILS NFR BLD AUTO: 0 %
BILIRUB SERPL-MCNC: <0.2 MG/DL (ref 0–1.2)
BUN SERPL-MCNC: 17 MG/DL (ref 6–24)
BUN/CREAT SERPL: 16 (ref 9–23)
CALCIUM SERPL-MCNC: 9.6 MG/DL (ref 8.7–10.2)
CHLORIDE SERPL-SCNC: 102 MMOL/L (ref 96–106)
CO2 SERPL-SCNC: 19 MMOL/L (ref 18–29)
CREAT SERPL-MCNC: 1.04 MG/DL (ref 0.57–1)
EOSINOPHIL # BLD AUTO: 0.1 X10E3/UL (ref 0–0.4)
EOSINOPHIL NFR BLD AUTO: 2 %
ERYTHROCYTE [DISTWIDTH] IN BLOOD BY AUTOMATED COUNT: 15.1 % (ref 12.3–15.4)
GFR SERPLBLD CREATININE-BSD FMLA CKD-EPI: 63 ML/MIN/1.73
GFR SERPLBLD CREATININE-BSD FMLA CKD-EPI: 73 ML/MIN/1.73
GLOBULIN SER CALC-MCNC: 3 G/DL (ref 1.5–4.5)
GLUCOSE SERPL-MCNC: 82 MG/DL (ref 65–99)
HCT VFR BLD AUTO: 38 % (ref 34–46.6)
HGB BLD-MCNC: 12.8 G/DL (ref 11.1–15.9)
IMM GRANULOCYTES # BLD: 0 X10E3/UL (ref 0–0.1)
IMM GRANULOCYTES NFR BLD: 0 %
LYMPHOCYTES # BLD AUTO: 1.4 X10E3/UL (ref 0.7–3.1)
LYMPHOCYTES NFR BLD AUTO: 25 %
MCH RBC QN AUTO: 30 PG (ref 26.6–33)
MCHC RBC AUTO-ENTMCNC: 33.7 G/DL (ref 31.5–35.7)
MCV RBC AUTO: 89 FL (ref 79–97)
MONOCYTES # BLD AUTO: 0.3 X10E3/UL (ref 0.1–0.9)
MONOCYTES NFR BLD AUTO: 6 %
NEUTROPHILS # BLD AUTO: 3.8 X10E3/UL (ref 1.4–7)
NEUTROPHILS NFR BLD AUTO: 67 %
PLATELET # BLD AUTO: 255 X10E3/UL (ref 150–379)
POTASSIUM SERPL-SCNC: 4.8 MMOL/L (ref 3.5–5.2)
PROT SERPL-MCNC: 7.1 G/DL (ref 6–8.5)
RBC # BLD AUTO: 4.27 X10E6/UL (ref 3.77–5.28)
SODIUM SERPL-SCNC: 141 MMOL/L (ref 134–144)
WBC # BLD AUTO: 5.6 X10E3/UL (ref 3.4–10.8)

## 2017-11-28 NOTE — PROGRESS NOTES
Kidney function improved  Blood counts normalized  All other labs are within normal limits.    Please inform

## 2017-11-30 NOTE — PROGRESS NOTES
[phone] 2nd attempt to contact pt. Left a VM for pt to [facility] to office.

## 2017-12-05 NOTE — PROGRESS NOTES
3rd Attempt to contact patient. Voicemail box states it is full,unable to leave a message.  Letter printed and sent to address on file

## 2018-02-13 ENCOUNTER — OFFICE VISIT (OUTPATIENT)
Dept: FAMILY MEDICINE CLINIC | Age: 48
End: 2018-02-13

## 2018-02-13 ENCOUNTER — HOSPITAL ENCOUNTER (OUTPATIENT)
Dept: LAB | Age: 48
Discharge: HOME | End: 2018-02-13
Payer: MEDICARE

## 2018-02-13 VITALS
OXYGEN SATURATION: 97 % | HEART RATE: 79 BPM | SYSTOLIC BLOOD PRESSURE: 125 MMHG | TEMPERATURE: 97.9 F | RESPIRATION RATE: 18 BRPM | DIASTOLIC BLOOD PRESSURE: 81 MMHG | BODY MASS INDEX: 25.9 KG/M2 | WEIGHT: 165 LBS | HEIGHT: 67 IN

## 2018-02-13 DIAGNOSIS — F31.9 BIPOLAR DEPRESSION (HCC): Primary | Chronic | ICD-10-CM

## 2018-02-13 DIAGNOSIS — R82.90 ABNORMAL URINE ODOR: ICD-10-CM

## 2018-02-13 LAB
BILIRUB UR QL STRIP: NORMAL
GLUCOSE UR-MCNC: NEGATIVE MG/DL
KETONES P FAST UR STRIP-MCNC: NEGATIVE MG/DL
PH UR STRIP: 5.5 [PH] (ref 4.6–8)
PROT UR QL STRIP: NEGATIVE
SP GR UR STRIP: 1.02 (ref 1–1.03)
UA UROBILINOGEN AMB POC: NORMAL (ref 0.2–1)
URINALYSIS CLARITY POC: CLEAR
URINALYSIS COLOR POC: YELLOW
URINE BLOOD POC: NORMAL
URINE LEUKOCYTES POC: NORMAL
URINE NITRITES POC: NEGATIVE

## 2018-02-13 PROCEDURE — 87088 URINE BACTERIA CULTURE: CPT

## 2018-02-13 PROCEDURE — 87077 CULTURE AEROBIC IDENTIFY: CPT

## 2018-02-13 PROCEDURE — 87186 SC STD MICRODIL/AGAR DIL: CPT

## 2018-02-13 PROCEDURE — 87086 URINE CULTURE/COLONY COUNT: CPT

## 2018-02-13 RX ORDER — LANOLIN ALCOHOL/MO/W.PET/CERES
325 CREAM (GRAM) TOPICAL
Qty: 90 TAB | Refills: 1 | Status: SHIPPED | OUTPATIENT
Start: 2018-02-13 | End: 2018-07-31 | Stop reason: SDUPTHER

## 2018-02-13 RX ORDER — NITROFURANTOIN 25; 75 MG/1; MG/1
100 CAPSULE ORAL 2 TIMES DAILY
Qty: 14 CAP | Refills: 0 | Status: SHIPPED | OUTPATIENT
Start: 2018-02-13 | End: 2018-02-20

## 2018-02-13 RX ORDER — ARIPIPRAZOLE 20 MG/1
TABLET ORAL
Qty: 90 TAB | Refills: 1 | Status: SHIPPED | OUTPATIENT
Start: 2018-02-13 | End: 2018-07-31 | Stop reason: SDUPTHER

## 2018-02-13 RX ORDER — QUETIAPINE FUMARATE 100 MG/1
TABLET, FILM COATED ORAL
Qty: 270 TAB | Refills: 1 | Status: SHIPPED | OUTPATIENT
Start: 2018-02-13 | End: 2018-07-31 | Stop reason: SDUPTHER

## 2018-02-13 NOTE — PROGRESS NOTES
Pt here c/o strong urinary odor x 2-3 weeks. Denies having any urinary pain or burning associated with odor. Has not taken any OTC medication. Also requesting refills on Iron, Abilify, and Seroquel. Pt states she is no longer seeing her psychiatrist.     Pt requesting pain medication refill, has a pin contract with Dr. Christine Haywood. Subjective: (As above and below)     Chief Complaint   Patient presents with    Medication Refill    Urinary Odor     she is a 50y.o. year old female who presents for evaluation. Reviewed PmHx, RxHx, FmHx, SocHx, AllgHx and updated in chart. Review of Systems - negative except as listed above    Objective:     Vitals:    02/13/18 1412   BP: 125/81   Pulse: 79   Resp: 18   Temp: 97.9 °F (36.6 °C)   TempSrc: Oral   SpO2: 97%   Weight: 165 lb (74.8 kg)   Height: 5' 7\" (1.702 m)     Physical Examination: General appearance - alert, well appearing, and in no distress  Mental status - normal mood, behavior, speech, dress, motor activity, and thought processes  Mouth - mucous membranes moist, pharynx normal without lesions  Chest - clear to auscultation, no wheezes, rales or rhonchi, symmetric air entry  Heart - normal rate, regular rhythm, normal S1, S2, no murmurs, rubs, clicks or gallops  Musculoskeletal - no joint tenderness, deformity or swelling  Extremities - peripheral pulses normal, no pedal edema, no clubbing or cyanosis    Assessment/ Plan:   1. Bipolar depression ([facility] [address]  -refill medication while pt finds a new psychiatrist with her new insurance  - ARIPiprazole (ABILIFY) 20 mg tablet; TK 1 T PO QD  Dispense: 90 Tab; Refill: 1  - QUEtiapine (SEROQUEL) 100 mg tablet; TAKE 2 TABLETS BY MOUTH EVERY MORNING AND TAKE 1 TABLET BY MOUTH EVERY EVENING  Dispense: 270 Tab; Refill: 1    2. Abnormal urine odor  - AMB POC URINALYSIS DIP STICK AUTO W/O MICRO  - CULTURE, URINE  - nitrofurantoin, macrocrystal-monohydrate, (MACROBID) 100 mg capsule;  Take 1 Cap by mouth two (2) times a day for 7 days. Dispense: 14 Cap; Refill: 0     Declined pain medication refill due to active pain contract. Pt bobby, has appt scheduled. Follow-up Disposition: As needed  I have discussed the diagnosis with the patient and the intended plan as seen in the above orders. The patient has received an after-visit summary and questions were answered concerning future plans.      Medication Side Effects and Warnings were discussed with patient: yes  Patient Labs were reviewed: yes  Patient Past Records were reviewed:  yes    Valentino Umana M.D.

## 2018-02-13 NOTE — PROGRESS NOTES
Pt here c/o strong urinary odor x 2-3 weeks. Denies having any urinary pain or burning associated with odor. Has not taken any OTC medication. Also requesting refills on Iron, Abilify, and Seroquel.   Pt states she is no longer seeing her psychiatrist.

## 2018-02-15 LAB — BACTERIA UR CULT: ABNORMAL

## 2018-07-24 ENCOUNTER — OFFICE VISIT (OUTPATIENT)
Dept: FAMILY MEDICINE CLINIC | Age: 48
End: 2018-07-24

## 2018-07-24 NOTE — MR AVS SNAPSHOT
[address] [address] 
[phone] Patient: Gayathri Shen MRN: [id number] VWW:1/62/[id number] Visit Information Date & Time Provider Department Dept. Phone Encounter #  
 7/24/2018  9:30 AM Vahid Abreu MD [address] [facility] [phone] [id number] Your Appointments 7/31/2018  8:50 AM  
ESTABLISHED PATIENT with Stefanie Mosqueda MD  
[address] [facility] ([facility] Appt Note: med refill  
 [address] [address] [address] [address]  
[phone]  
  
   
 [address] [address] [address] [address] Upcoming Health Maintenance Date Due DTaP/Tdap/Td series (1 - Tdap) 2/15/1989 BREAST CANCER SCRN MAMMOGRAM 2/15/2018 FOBT Q 1 YEAR AGE 50-75 2/15/2018 MEDICARE YEARLY EXAM 6/6/2018 Influenza Age 5 to Adult 8/1/2018 PAP AKA CERVICAL CYTOLOGY 5/26/2020 Allergies as of 7/24/2018  Review Complete On: 7/24/2018 By: Vahid Abreu MD  
 No Known Allergies Current Immunizations  Reviewed on 2/6/2017 Name Date Influenza Vaccine (Quad) PF 2/6/2017 Not reviewed this visit You Were Diagnosed With   
  
 Codes Comments ERRONEOUS ENCOUNTER--DISREGARD    -  Primary ICD-9-CM: 94310 Vitals OB Status Smoking Status Ablation Current Every Day Smoker [facility] [address], [address] [facility] [phone] Your Updated Medication List  
  
   
This list is accurate as of 7/24/18  1:44 PM.  Always use your most recent med list.  
  
  
  
  
 ARIPiprazole 20 mg tablet Commonly known as:  ABILIFY TK 1 T PO QD  
  
 calcitRIOL 0.5 mcg capsule Commonly known as:  ROCALTROL Take 0.5 mcg by mouth daily. ferrous sulfate 325 mg (65 mg iron) tablet Take 1 Tab by mouth Daily (before breakfast). JOLIVETTE 0.35 mg Tab Generic drug:  norethindrone Take  by mouth. QUEtiapine 100 mg tablet Commonly known as:  SEROquel TAKE 2 TABLETS BY MOUTH EVERY MORNING AND TAKE 1 TABLET BY MOUTH EVERY EVENING  
  
 topiramate 25 mg tablet Commonly known as:  TOPAMAX Take 25 mg by mouth nightly. traZODone 50 mg tablet Commonly known as:  Saeed [place] Take 50 mg by mouth nightly. Introducing [facility] & HEALTH SERVICES! Gracia Flores introduces Standard Treasury patient portal. Now you can access parts of your medical record, email your doctor's office, and request medication refills online. 1. In your internet browser, go to [email or web address] 2. Click on the First Time User? Click Here link in the Sign In box. You will see the New Member Sign Up page. 3. Enter your Standard Treasury Access Code exactly as it appears below. You will not need to use this code after youve completed the sign-up process. If you do not sign up before the expiration date, you must request a new code. · Standard [employer] Access Code: [email or web address] Expires: 10/22/2018  1:44 PM 
 
4. Enter the last four digits of your Social Security Number (xxxx) and Date of Birth (mm/dd/yyyy) as indicated and click Submit. You will be taken to the next sign-up page. 5. Create a Standard Treasury ID. This will be your Standard Treasury login ID and cannot be changed, so think of one that is secure and easy to remember. 6. Create a Standard Treasury password. You can change your password at any time. 7. Enter your Password Reset Question and Answer. This can be used at a later time if you forget your password. 8. Enter your e-mail address. You will receive e-mail notification when new information is available in [address] 9. Click Sign Up. You can now view and download portions of your medical record. 10. Click the Download Summary menu link to download a portable copy of your medical information. If you have questions, please visit the Frequently Asked Questions section of the [employer] website. Remember, Info is NOT to be used for urgent needs. For medical emergencies, dial 911. Now available from your iPhone and Android! Please provide this summary of care documentation to your next provider. Your primary care clinician is listed as Amor Mosqueda. If you have any questions after today's visit, please call [phone].

## 2018-07-31 ENCOUNTER — OFFICE VISIT (OUTPATIENT)
Dept: FAMILY MEDICINE CLINIC | Age: 48
End: 2018-07-31

## 2018-07-31 VITALS
OXYGEN SATURATION: 97 % | SYSTOLIC BLOOD PRESSURE: 103 MMHG | WEIGHT: 164 LBS | DIASTOLIC BLOOD PRESSURE: 72 MMHG | BODY MASS INDEX: 25.74 KG/M2 | TEMPERATURE: 97.5 F | RESPIRATION RATE: 16 BRPM | HEIGHT: 67 IN | HEART RATE: 65 BPM

## 2018-07-31 DIAGNOSIS — F31.9 BIPOLAR DEPRESSION (HCC): Chronic | ICD-10-CM

## 2018-07-31 DIAGNOSIS — F41.9 ANXIETY: ICD-10-CM

## 2018-07-31 DIAGNOSIS — R79.9 ABNORMAL FINDING OF BLOOD CHEMISTRY: ICD-10-CM

## 2018-07-31 DIAGNOSIS — M54.5 CHRONIC LOW BACK PAIN, UNSPECIFIED BACK PAIN LATERALITY, WITH SCIATICA PRESENCE UNSPECIFIED: ICD-10-CM

## 2018-07-31 DIAGNOSIS — N89.8 VAGINAL DISCHARGE: ICD-10-CM

## 2018-07-31 DIAGNOSIS — G89.29 CHRONIC LOW BACK PAIN, UNSPECIFIED BACK PAIN LATERALITY, WITH SCIATICA PRESENCE UNSPECIFIED: ICD-10-CM

## 2018-07-31 DIAGNOSIS — F31.9 BIPOLAR DEPRESSION (HCC): Primary | Chronic | ICD-10-CM

## 2018-07-31 DIAGNOSIS — Z12.11 SCREEN FOR COLON CANCER: ICD-10-CM

## 2018-07-31 DIAGNOSIS — E61.1 IRON DEFICIENCY: ICD-10-CM

## 2018-07-31 DIAGNOSIS — E78.00 ELEVATED CHOLESTEROL: ICD-10-CM

## 2018-07-31 DIAGNOSIS — Z12.31 SCREENING MAMMOGRAM, ENCOUNTER FOR: ICD-10-CM

## 2018-07-31 DIAGNOSIS — R82.90 ABNORMAL URINE ODOR: ICD-10-CM

## 2018-07-31 LAB
BILIRUB UR QL STRIP: NEGATIVE
GLUCOSE UR-MCNC: NEGATIVE MG/DL
KETONES P FAST UR STRIP-MCNC: NEGATIVE MG/DL
PH UR STRIP: 6 [PH] (ref 4.6–8)
PROT UR QL STRIP: NEGATIVE
SP GR UR STRIP: 1.03 (ref 1–1.03)
UA UROBILINOGEN AMB POC: NORMAL (ref 0.2–1)
URINALYSIS CLARITY POC: CLEAR
URINALYSIS COLOR POC: YELLOW
URINE BLOOD POC: NORMAL
URINE LEUKOCYTES POC: NEGATIVE
URINE NITRITES POC: NEGATIVE

## 2018-07-31 RX ORDER — QUETIAPINE FUMARATE 100 MG/1
TABLET, FILM COATED ORAL
Qty: 90 TAB | Refills: 5 | Status: SHIPPED | OUTPATIENT
Start: 2018-07-31 | End: 2018-07-31 | Stop reason: SDUPTHER

## 2018-07-31 RX ORDER — ARIPIPRAZOLE 20 MG/1
TABLET ORAL
Qty: 30 TAB | Refills: 5 | Status: SHIPPED | OUTPATIENT
Start: 2018-07-31 | End: 2018-07-31 | Stop reason: SDUPTHER

## 2018-07-31 RX ORDER — CALCITRIOL 0.5 UG/1
0.5 CAPSULE ORAL DAILY
Qty: 30 CAP | Refills: 5 | Status: SHIPPED | OUTPATIENT
Start: 2018-07-31 | End: 2018-07-31 | Stop reason: SDUPTHER

## 2018-07-31 RX ORDER — TOPIRAMATE 25 MG/1
25 TABLET ORAL
Qty: 30 TAB | Refills: 5 | Status: SHIPPED | OUTPATIENT
Start: 2018-07-31 | End: 2018-07-31 | Stop reason: SDUPTHER

## 2018-07-31 RX ORDER — TRAZODONE HYDROCHLORIDE 50 MG/1
50 TABLET ORAL
Qty: 30 TAB | Refills: 5 | Status: SHIPPED | OUTPATIENT
Start: 2018-07-31 | End: 2018-07-31 | Stop reason: SDUPTHER

## 2018-07-31 RX ORDER — CALCITRIOL 0.5 UG/1
CAPSULE ORAL
Qty: 90 CAP | Refills: 5 | Status: SHIPPED | OUTPATIENT
Start: 2018-07-31 | End: 2019-06-19 | Stop reason: SDUPTHER

## 2018-07-31 RX ORDER — QUETIAPINE FUMARATE 100 MG/1
TABLET, FILM COATED ORAL
Qty: 270 TAB | Refills: 5 | Status: SHIPPED | OUTPATIENT
Start: 2018-07-31 | End: 2019-01-22 | Stop reason: DRUGHIGH

## 2018-07-31 RX ORDER — TOPIRAMATE 25 MG/1
TABLET ORAL
Qty: 90 TAB | Refills: 5 | Status: SHIPPED | OUTPATIENT
Start: 2018-07-31 | End: 2019-01-22 | Stop reason: DRUGHIGH

## 2018-07-31 RX ORDER — TRAZODONE HYDROCHLORIDE 50 MG/1
TABLET ORAL
Qty: 90 TAB | Refills: 5 | Status: SHIPPED | OUTPATIENT
Start: 2018-07-31 | End: 2019-01-22 | Stop reason: ALTCHOICE

## 2018-07-31 RX ORDER — HYDROXYZINE PAMOATE 25 MG/1
25 CAPSULE ORAL
Qty: 90 CAP | Refills: 5 | Status: SHIPPED | OUTPATIENT
Start: 2018-07-31 | End: 2018-08-14

## 2018-07-31 RX ORDER — ARIPIPRAZOLE 20 MG/1
TABLET ORAL
Qty: 90 TAB | Refills: 5 | Status: SHIPPED | OUTPATIENT
Start: 2018-07-31 | End: 2018-10-22 | Stop reason: SINTOL

## 2018-07-31 RX ORDER — METRONIDAZOLE 7.5 MG/G
1 GEL VAGINAL
Qty: 187.5 MG | Refills: 0 | Status: SHIPPED | OUTPATIENT
Start: 2018-07-31 | End: 2018-08-05

## 2018-07-31 RX ORDER — LANOLIN ALCOHOL/MO/W.PET/CERES
325 CREAM (GRAM) TOPICAL
Qty: 30 TAB | Refills: 5 | Status: SHIPPED | OUTPATIENT
Start: 2018-07-31

## 2018-07-31 NOTE — PROGRESS NOTES
Pt here requesting refills on all RXs and routine fasting lab work. C/o ongoing back pain x several years and urinary odor x 1 month. Pt needs referrals to her GYN and her pain management. Pt needs refills of all medications. Pt also reports ongoing vaginal discharge and odor. Pt also reports back pain for which she is seeing a pain specialist and had a back injection 2 months ago. Subjective: (As above and below) Chief Complaint Patient presents with  Labs  
  cholesterol check  Medication Refill  Urinary Odor  
 
she is a 46y.o. year old female who presents for evaluation. Reviewed PmHx, RxHx, FmHx, SocHx, AllgHx and updated in chart. Review of Systems - negative except as listed above Objective:  
 
Vitals:  
 07/31/18 0164 BP: 103/72 Pulse: 65 Resp: 16 Temp: 97.5 °F (36.4 °C) TempSrc: Oral  
SpO2: 97% Weight: 164 lb (74.4 kg) Height: 5' 7\" (1.702 m) Physical Examination: General appearance - alert, well appearing, and in no distress Mental status - normal mood, behavior, speech, dress, motor activity, and thought processes Mouth - mucous membranes moist, pharynx normal without lesions Chest - clear to auscultation, no wheezes, rales or rhonchi, symmetric air entry Heart - normal rate, regular rhythm, normal S1, S2, no murmurs, rubs, clicks or gallops Back exam - limited range of motion, diffuse tenderness, pt changing position frequently during exam 
 
Assessment/ Plan: 1. Bipolar depression ([facility] [address] -refilled medications, referred to psychiatry - QUEtiapine (SEROQUEL) 100 mg tablet; TAKE 2 TABLETS BY MOUTH EVERY MORNING AND TAKE 1 TABLET BY MOUTH EVERY EVENING  Dispense: 90 Tab; Refill: 5 
- ARIPiprazole (ABILIFY) 20 mg tablet; TK 1 T PO QD  Dispense: 30 Tab; Refill: 5 
- calcitRIOL (ROCALTROL) 0.5 mcg capsule; Take 1 Cap by mouth daily. Dispense: 30 Cap; Refill: 5 
- traZODone (DESYREL) 50 mg tablet; Take 1 Tab by mouth nightly.   Dispense: 30 Tab; Refill: 5 
- topiramate (TOPAMAX) 25 mg tablet; Take 1 Tab by mouth nightly. Dispense: 30 Tab; Refill: 5 
 
2. Abnormal urine odor - AMB POC URINALYSIS DIP STICK AUTO W/O MICRO 
- REFERRAL TO OBSTETRICS AND GYNECOLOGY 3. Chronic low back pain, unspecified back pain laterality, with sciatica presence unspecified 
-refer for ongoing care 
- REFERRAL TO PAIN MANAGEMENT 4. Iron deficiency 
- ferrous sulfate 325 mg (65 mg iron) tablet; Take 1 Tab by mouth Daily (before breakfast). Dispense: 30 Tab; Refill: 5 
- CBC WITH AUTOMATED DIFF 
- IRON PROFILE 
- FERRITIN 5. Vaginal discharge 
- NUSWAB VAGINITIS PLUS 
- metroNIDAZOLE (METROGEL) 0.75 % vaginal gel; Insert 1 Applicator into vagina nightly for 5 days. Dispense: 187.5 mg; Refill: 0 
 
6. Elevated cholesterol 
-check fastin glabs - METABOLIC PANEL, COMPREHENSIVE 
- LIPID PANEL 
- HEMOGLOBIN A1C WITH EAG 
- TSH 3RD GENERATION 
- VITAMIN D, 25 HYDROXY 7. Abnormal finding of blood chemistry  
- HEMOGLOBIN A1C WITH EAG 
 
8. Screen for colon cancer [facility] 9. Screening mammogram, encounter for - NORTH MAMMO BI SCREENING INCL CAD; Future 10. Anxiety 
-trial of vistaril 
- hydrOXYzine pamoate (VISTARIL) 25 mg capsule; Take 1 Cap by mouth three (3) times daily as needed for Anxiety for up to 14 days. Dispense: 90 Cap; Refill: 5 Follow-up Disposition: As needed I have discussed the diagnosis with the patient and the intended plan as seen in the above orders. The patient has received an after-visit summary and questions were answered concerning future plans. Medication Side Effects and Warnings were discussed with patient: yes Patient Labs were reviewed: yes Patient Past Records were reviewed:  yes Zachary Kramer M.D.

## 2018-07-31 NOTE — MR AVS SNAPSHOT
[address] [address] 
[phone] Patient: Papo Sanchez MRN: [id number] XEN:[birth year] Visit Information Date & Time Provider Department Dept. Phone Encounter #  
 7/31/2018  8:50 AM Jose Antonio Hamilton MD [address] [facility] [phone] [id number] Upcoming Health Maintenance Date Due DTaP/Tdap/Td series (1 - Tdap) 2/15/1989 BREAST CANCER SCRN MAMMOGRAM 2/15/2018 FOBT Q 1 YEAR AGE 50-75 2/15/2018 Influenza Age 5 to Adult 8/1/2018 PAP AKA CERVICAL CYTOLOGY 5/26/2020 Allergies as of 7/31/2018  Review Complete On: 7/31/2018 By: Jose Antonio Hamilton MD  
 No Known Allergies Current Immunizations  Reviewed on 2/6/2017 Name Date Influenza Vaccine (Quad) PF 2/6/2017 Not reviewed this visit You Were Diagnosed With   
  
 Codes Comments Bipolar depression ([facility] [address]    -  Primary ICD-10-CM: F31.30 ICD-9-CM: 296.50 Abnormal urine odor     ICD-10-CM: R82.90 ICD-9-CM: 791.9 Chronic low back pain, unspecified back pain laterality, with sciatica presence unspecified     ICD-10-CM: M54.5, G89.29 ICD-9-CM: 724.2, 338.29 Iron deficiency     ICD-10-CM: E61.1 ICD-9-CM: 280.9 Vaginal discharge     ICD-10-CM: N89.8 ICD-9-CM: 623.5 Elevated cholesterol     ICD-10-CM: E78.00 ICD-9-CM: 272.0 Abnormal finding of blood chemistry     ICD-10-CM: R79.9 ICD-9-CM: 790.6 Screen for colon cancer     ICD-10-CM: Z12.11 ICD-9-CM: V76.51 Screening mammogram, encounter for     ICD-10-CM: Z12.31 
ICD-9-CM: V76.12 Anxiety     ICD-10-CM: F41.9 ICD-9-CM: 300.00 Vitals BP Pulse Temp Resp Height(growth percentile) Weight(growth percentile) 103/72 (BP 1 Location: Left arm, BP Patient Position: Sitting) 65 97.5 °F (36.4 °C) (Oral) 16 5' 7\" (1.702 m) 164 lb (74.4 kg) SpO2 BMI OB Status Smoking Status 97% 25.69 kg/m2 Ablation Current Every Day Smoker Vitals History BMI and BSA Data Body Mass Index Body Surface Area  
 25.69 kg/m 2 1.88 m 2 Preferred Pharmacy Pharmacy Name Phone [facility] [address], [address] [facility] [phone] Your Updated Medication List  
  
   
This list is accurate as of 7/31/18 10:19 AM.  Always use your most recent med list.  
  
  
  
  
 ARIPiprazole 20 mg tablet Commonly known as:  ABILIFY TK 1 T PO QD  
  
 calcitRIOL 0.5 mcg capsule Commonly known as:  ROCALTROL Take 1 Cap by mouth daily. ferrous sulfate 325 mg (65 mg iron) tablet Take 1 Tab by mouth Daily (before breakfast). hydrOXYzine pamoate 25 mg capsule Commonly known as:  VISTARIL Take 1 Cap by mouth three (3) times daily as needed for Anxiety for up to 14 days. JOLIVETTE 0.35 mg Tab Generic drug:  norethindrone Take  by mouth.  
  
 metroNIDAZOLE 0.75 % vaginal gel Commonly known as:  Vimal Matters Insert 1 Applicator into vagina nightly for 5 days. QUEtiapine 100 mg tablet Commonly known as:  SEROquel TAKE 2 TABLETS BY MOUTH EVERY MORNING AND TAKE 1 TABLET BY MOUTH EVERY EVENING  
  
 topiramate 25 mg tablet Commonly known as:  TOPAMAX Take 1 Tab by mouth nightly. traZODone 50 mg tablet Commonly known as:  Carry Welaka Take 1 Tab by mouth nightly. Prescriptions Sent to Pharmacy Refills QUEtiapine (SEROQUEL) 100 mg tablet 5 Sig: TAKE 2 TABLETS BY MOUTH EVERY MORNING AND TAKE 1 TABLET BY MOUTH EVERY EVENING Class: Normal  
 Pharmacy: [facility] [address], [address] [place] [address] Ph #: [phone] ARIPiprazole (ABILIFY) 20 mg tablet 5 Sig: TK 1 T PO QD  Class: Normal  
 Pharmacy: [facility] [address] [facility] [address] AT [place] Ph #: [phone]  
 ferrous sulfate 325 mg (65 mg iron) tablet 5  
 Sig: Take 1 Tab by mouth Daily (before breakfast). Class: Normal  
 Pharmacy: [address] Ph #: [phone] Route: Oral  
 calcitRIOL (ROCALTROL) 0.5 mcg capsule 5 Sig: Take 1 Cap by mouth daily. Class: Normal  
 Pharmacy: [address] Ph #: [phone] Route: Oral  
 traZODone (DESYREL) 50 mg tablet 5 Sig: Take 1 Tab by mouth nightly. Class: Normal  
 Pharmacy: [address] Ph #: [phone] Route: Oral  
 topiramate (TOPAMAX) 25 mg tablet 5 Sig: Take 1 Tab by mouth nightly. Class: Normal  
 Pharmacy: [address] Ph #: [phone] Route: Oral  
 metroNIDAZOLE (METROGEL) 0.75 % vaginal gel 0 Sig: Insert 1 Applicator into vagina nightly for 5 days. Class: Normal  
 Pharmacy: [address] Ph #: [phone] Route: Vaginal  
 hydrOXYzine pamoate (VISTARIL) 25 mg capsule 5 Sig: Take 1 Cap by mouth three (3) times daily as needed for Anxiety for up to 14 days. Class: Normal  
 Pharmacy: [address] Ph #: [phone] Route: Oral  
  
We Performed the Following AMB POC URINALYSIS DIP STICK AUTO W/O MICRO [32362 CPT(R)] CBC WITH AUTOMATED DIFF [75147 CPT(R)] FERRITIN [69927 CPT(R)] HEMOGLOBIN A1C WITH EAG [87781 CPT(R)] IRON PROFILE [id number] CPT(R)] LIPID PANEL [39825 CPT(R)] METABOLIC PANEL, COMPREHENSIVE [01153 CPT(R)] [address] [id number] Custom] REFERRAL TO GASTROENTEROLOGY [MLX96 Custom] REFERRAL TO OBSTETRICS AND GYNECOLOGY [REF51 Custom] REFERRAL TO PAIN MANAGEMENT [KQJ851 Custom] TSH 3RD GENERATION [35403 CPT(R)] VITAMIN D, 25 HYDROXY [id number] CPT(R)] To-Do List   
 07/31/2018 Imaging:  NORTH MAMMO BI SCREENING INCL CAD Referral Information Referral ID Referred By Referred To  
  
 [id number] Carey ENRIQUEZ [facility] [address] [facility] Visits Status Start Date End Date 1 New Request 7/31/18 7/31/19 If your referral has a status of pending review or denied, additional information will be sent to support the outcome of this decision. Referral ID Referred By Referred To  
 [id number] Suzie JENKINS MD  
   [facility] [address] [place], [address] [place] [address] Phone: [phone] Fax: [phone] Visits Status Start Date End Date 1 New Request 7/31/18 7/31/19 If your referral has a status of pending review or denied, additional information will be sent to support the outcome of this decision. Referral ID Referred By Referred To  
 [id number] ALICE, [facility]  
   [address]  [place], [address] [facility] Visits Status Start Date End Date 1 New Request 7/31/18 7/31/19 If your referral has a status of pending review or denied, additional information will be sent to support the outcome of this decision. Introducing \[facility]\"" & HEALTH SERVICES! [employer] introduces W4 patient portal. Now you can access parts of your medical record, email your doctor's office, and request medication refills online. 1. In your internet browser, go to [email or web address] 2. Click on the First Time User? Click Here link in the Sign In box. You will see the New Member Sign Up page. 3. Enter your W4 Access Code exactly as it appears below. You will not need to use this code after youve completed the sign-up process.  If you do not sign up before the expiration date, you must request a new code. · [employer] Access Code: [email or web address] Expires: 10/22/2018  1:44 PM 
 
4. Enter the last four digits of your Social Security Number (xxxx) and Date of Birth (mm/dd/yyyy) as indicated and click Submit. You will be taken to the next sign-up page. 5. Create a [employer] ID. This will be your [employer] login ID and cannot be changed, so think of one that is secure and easy to remember. 6. Create a [employer] password. You can change your password at any time. 7. Enter your Password Reset Question and Answer. This can be used at a later time if you forget your password. 8. Enter your e-mail address. You will receive e-mail notification when new information is available in [address] 9. Click Sign Up. You can now view and download portions of your medical record. 10. Click the Download Summary menu link to download a portable copy of your medical information. If you have questions, please visit the Frequently Asked Questions section of the [employer] website. Remember, [employer] is NOT to be used for urgent needs. For medical emergencies, dial 911. Now available from your iPhone and Android! Please provide this summary of care documentation to your next provider. Your primary care clinician is listed as Amor Mosqueda. If you have any questions after today's visit, please call [phone].

## 2018-08-03 LAB
25(OH)D3+25(OH)D2 SERPL-MCNC: 20.4 NG/ML (ref 30–100)
A VAGINAE DNA VAG QL NAA+PROBE: ABNORMAL SCORE
ALBUMIN SERPL-MCNC: 4 G/DL (ref 3.5–5.5)
ALBUMIN/GLOB SERPL: 1.5 {RATIO} (ref 1.2–2.2)
ALP SERPL-CCNC: 58 IU/L (ref 39–117)
ALT SERPL-CCNC: 10 IU/L (ref 0–32)
AST SERPL-CCNC: 12 IU/L (ref 0–40)
BASOPHILS # BLD AUTO: 0 X10E3/UL (ref 0–0.2)
BASOPHILS NFR BLD AUTO: 0 %
BILIRUB SERPL-MCNC: 0.3 MG/DL (ref 0–1.2)
BUN SERPL-MCNC: 11 MG/DL (ref 6–24)
BUN/CREAT SERPL: 10 (ref 9–23)
BVAB2 DNA VAG QL NAA+PROBE: ABNORMAL SCORE
C ALBICANS DNA VAG QL NAA+PROBE: NEGATIVE
C GLABRATA DNA VAG QL NAA+PROBE: NEGATIVE
C TRACH RRNA SPEC QL NAA+PROBE: NEGATIVE
CALCIUM SERPL-MCNC: 9.4 MG/DL (ref 8.7–10.2)
CHLORIDE SERPL-SCNC: 106 MMOL/L (ref 96–106)
CHOLEST SERPL-MCNC: 205 MG/DL (ref 100–199)
CO2 SERPL-SCNC: 20 MMOL/L (ref 20–29)
CREAT SERPL-MCNC: 1.12 MG/DL (ref 0.57–1)
EOSINOPHIL # BLD AUTO: 0.1 X10E3/UL (ref 0–0.4)
EOSINOPHIL NFR BLD AUTO: 2 %
ERYTHROCYTE [DISTWIDTH] IN BLOOD BY AUTOMATED COUNT: 13.2 % (ref 12.3–15.4)
EST. AVERAGE GLUCOSE BLD GHB EST-MCNC: 94 MG/DL
FERRITIN SERPL-MCNC: 39 NG/ML (ref 15–150)
GLOBULIN SER CALC-MCNC: 2.7 G/DL (ref 1.5–4.5)
GLUCOSE SERPL-MCNC: 86 MG/DL (ref 65–99)
HBA1C MFR BLD: 4.9 % (ref 4.8–5.6)
HCT VFR BLD AUTO: 37.6 % (ref 34–46.6)
HDLC SERPL-MCNC: 53 MG/DL
HGB BLD-MCNC: 12.8 G/DL (ref 11.1–15.9)
IMM GRANULOCYTES # BLD: 0 X10E3/UL (ref 0–0.1)
IMM GRANULOCYTES NFR BLD: 0 %
INTERPRETATION, 910389: NORMAL
INTERPRETATION: NORMAL
IRON SATN MFR SERPL: 24 % (ref 15–55)
IRON SERPL-MCNC: 75 UG/DL (ref 27–159)
LDLC SERPL CALC-MCNC: 126 MG/DL (ref 0–99)
LYMPHOCYTES # BLD AUTO: 2 X10E3/UL (ref 0.7–3.1)
LYMPHOCYTES NFR BLD AUTO: 40 %
MCH RBC QN AUTO: 33.4 PG (ref 26.6–33)
MCHC RBC AUTO-ENTMCNC: 34 G/DL (ref 31.5–35.7)
MCV RBC AUTO: 98 FL (ref 79–97)
MEGA1 DNA VAG QL NAA+PROBE: ABNORMAL SCORE
MONOCYTES # BLD AUTO: 0.4 X10E3/UL (ref 0.1–0.9)
MONOCYTES NFR BLD AUTO: 7 %
N GONORRHOEA RRNA SPEC QL NAA+PROBE: NEGATIVE
NEUTROPHILS # BLD AUTO: 2.6 X10E3/UL (ref 1.4–7)
NEUTROPHILS NFR BLD AUTO: 51 %
PDF IMAGE, 910387: NORMAL
PLATELET # BLD AUTO: 274 X10E3/UL (ref 150–379)
POTASSIUM SERPL-SCNC: 4.3 MMOL/L (ref 3.5–5.2)
PROT SERPL-MCNC: 6.7 G/DL (ref 6–8.5)
RBC # BLD AUTO: 3.83 X10E6/UL (ref 3.77–5.28)
SODIUM SERPL-SCNC: 141 MMOL/L (ref 134–144)
T VAGINALIS RRNA SPEC QL NAA+PROBE: NEGATIVE
TIBC SERPL-MCNC: 316 UG/DL (ref 250–450)
TRIGL SERPL-MCNC: 130 MG/DL (ref 0–149)
TSH SERPL DL<=0.005 MIU/L-ACNC: 1.01 UIU/ML (ref 0.45–4.5)
UIBC SERPL-MCNC: 241 UG/DL (ref 131–425)
VLDLC SERPL CALC-MCNC: 26 MG/DL (ref 5–40)
WBC # BLD AUTO: 5.1 X10E3/UL (ref 3.4–10.8)

## 2018-08-06 NOTE — PROGRESS NOTES
Swab positive for BV, please complete vaginal gel as written  Stable slight elevation in kidney function. Cholesterol is elevated, please closely monitor diet and increase exercise, recheck in 6 months. Vitamin D is slightly low, please take a daily supplement of at least 2000 IU (international units) daily. All other labs are within normal limits. Please inform.

## 2018-08-07 ENCOUNTER — TELEPHONE (OUTPATIENT)
Dept: FAMILY MEDICINE CLINIC | Age: 48
End: 2018-08-07

## 2018-08-29 ENCOUNTER — OFFICE VISIT (OUTPATIENT)
Dept: FAMILY MEDICINE CLINIC | Age: 48
End: 2018-08-29

## 2018-08-29 VITALS
HEIGHT: 67 IN | RESPIRATION RATE: 18 BRPM | TEMPERATURE: 97.9 F | WEIGHT: 165.8 LBS | BODY MASS INDEX: 26.02 KG/M2 | DIASTOLIC BLOOD PRESSURE: 75 MMHG | SYSTOLIC BLOOD PRESSURE: 112 MMHG | HEART RATE: 63 BPM | OXYGEN SATURATION: 96 %

## 2018-08-29 DIAGNOSIS — N30.01 ACUTE CYSTITIS WITH HEMATURIA: Primary | ICD-10-CM

## 2018-08-29 DIAGNOSIS — R30.0 DYSURIA: ICD-10-CM

## 2018-08-29 LAB
BILIRUB UR QL STRIP: NEGATIVE
GLUCOSE UR-MCNC: NEGATIVE MG/DL
KETONES P FAST UR STRIP-MCNC: NEGATIVE MG/DL
PH UR STRIP: 5.5 [PH] (ref 4.6–8)
PROT UR QL STRIP: POSITIVE
SP GR UR STRIP: 1.03 (ref 1–1.03)
UA UROBILINOGEN AMB POC: NORMAL (ref 0.2–1)
URINALYSIS CLARITY POC: NORMAL
URINALYSIS COLOR POC: NORMAL
URINE BLOOD POC: NORMAL
URINE LEUKOCYTES POC: NORMAL
URINE NITRITES POC: POSITIVE

## 2018-08-29 RX ORDER — CIPROFLOXACIN 500 MG/1
500 TABLET ORAL 2 TIMES DAILY
Qty: 14 TAB | Refills: 0 | Status: SHIPPED | OUTPATIENT
Start: 2018-08-29 | End: 2018-09-05

## 2018-08-29 NOTE — PROGRESS NOTES
Abel Woodard is a 46 y.o. female    Chief Complaint   Patient presents with    Urinary Frequency     pt states that they may have a bladder infection       1. Have you been to the ER, urgent care clinic since your last visit? Hospitalized since your last visit? No    2. Have you seen or consulted any other health care providers outside of the [address] [place] [address] since your last visit? Include any pap smears or colon screening. No    Pt states that she has a strong odor and her right knee aches when laying down.

## 2018-08-29 NOTE — MR AVS SNAPSHOT
[address] [place] [address] 
[phone] Patient: Kathy Grimm MRN: [id number] MYC:7/55/[birth year]34 Visit Information Date & Time Provider Department Dept. Phone Encounter #  
 8/29/2018  8:30 AM Nicole Benitez NP 5900 [facility] [phone] [id number] Upcoming Health Maintenance Date Due DTaP/Tdap/Td series (1 - Tdap) 2/15/1989 BREAST CANCER SCRN MAMMOGRAM 2/15/2018 FOBT Q 1 YEAR AGE 50-75 2/15/2018 Influenza Age 5 to Adult 8/1/2018 MEDICARE YEARLY EXAM 8/23/2018 PAP AKA CERVICAL CYTOLOGY 5/26/2020 Allergies as of 8/29/2018  Review Complete On: 8/29/2018 By: Nicole Benitez NP No Known Allergies Current Immunizations  Reviewed on 2/6/2017 Name Date Influenza Vaccine (Quad) PF 2/6/2017 Not reviewed this visit You Were Diagnosed With   
  
 Codes Comments Acute cystitis with hematuria    -  Primary ICD-10-CM: N30.01 
ICD-9-CM: 595.0 Dysuria     ICD-10-CM: R30.0 ICD-9-CM: 466. 1 Vitals BP Pulse Temp Resp Height(growth percentile) Weight(growth percentile) 112/75 (BP 1 Location: Left arm, BP Patient Position: Sitting) 63 97.9 °F (36.6 °C) (Oral) 18 5' 7\" (1.702 m) 165 lb 12.8 oz (75.2 kg) SpO2 BMI OB Status Smoking Status 96% 25.97 kg/m2 Ablation Current Every Day Smoker Vitals History BMI and BSA Data Body Mass Index Body Surface Area  
 25.97 kg/m 2 1.89 m 2 Preferred Pharmacy Pharmacy Name Phone [facility] [address], [address] [facility] [phone] Your Updated Medication List  
  
   
This list is accurate as of 8/29/18  9:15 AM.  Always use your most recent med list.  
  
  
  
  
 ARIPiprazole 20 mg tablet Commonly known as:  ABILIFY TAKE 1 TABLET BY MOUTH EVERY DAY  
  
 calcitRIOL 0.5 mcg capsule Commonly known as:  ROCALTROL TAKE 1 CAPSULE BY MOUTH DAILY  
  
 ciprofloxacin HCl 500 mg tablet Commonly known as:  CIPRO Take 1 Tab by mouth two (2) times a day for 7 days. ferrous sulfate 325 mg (65 mg iron) tablet Take 1 Tab by mouth Daily (before breakfast). JOLIVETTE 0.35 mg Tab Generic drug:  norethindrone Take  by mouth. QUEtiapine 100 mg tablet Commonly known as:  SEROquel TAKE TWO TABLETS BY MOUTH EVERY MORNING AND 1 TABLET EVERY EVENING  
  
 topiramate 25 mg tablet Commonly known as:  TOPAMAX TAKE 1 TABLET BY MOUTH EVERY NIGHT  
  
 traZODone 50 mg tablet Commonly known as:  DESYREL  
TAKE 1 TABLET BY MOUTH EVERY NIGHT Prescriptions Sent to Pharmacy Refills  
 ciprofloxacin HCl (CIPRO) 500 mg tablet 0 Sig: Take 1 Tab by mouth two (2) times a day for 7 days. Class: Normal  
 Pharmacy: [facility] [address] [place] [address] #: [phone] Route: Oral  
  
We Performed the Following AMB POC URINALYSIS DIP STICK AUTO W/O MICRO [64257 CPT(R)] Introducing [facility] & HEALTH SERVICES! [facility] introduces Window[employer] patient portal. Now you can access parts of your medical record, email your doctor's office, and request medication refills online. 1. In your internet browser, go to [email or web address] 2. Click on the First Time User? Click Here link in the Sign In box. You will see the New Member Sign Up page. 3. Enter your Window[employer] Access Code exactly as it appears below. You will not need to use this code after youve completed the sign-up process. If you do not sign up before the expiration date, you must request a new code. · [employer] Access Code: [email or web address] Expires: 10/22/2018  1:44 PM 
 
4. Enter the last four digits of your Social Security Number (xxxx) and Date of Birth (mm/dd/yyyy) as indicated and click Submit. You will be taken to the next sign-up page. 5. Create a Cal[employer] ID. This will be your [employer] login ID and cannot be changed, so think of one that is secure and easy to remember. 6. Create a [employer] password. You can change your password at any time. 7. Enter your Password Reset Question and Answer. This can be used at a later time if you forget your password. 8. Enter your e-mail address. You will receive e-mail notification when new information is available in [address] 9. Click Sign Up. You can now view and download portions of your medical record. 10. Click the Download Summary menu link to download a portable copy of your medical information. If you have questions, please visit the Frequently Asked Questions section of the [employer] website. Remember, [employer] is NOT to be used for urgent needs. For medical emergencies, dial 911. Now available from your iPhone and Android! Please provide this summary of care documentation to your next provider. Your primary care clinician is listed as Amor Mosqueda. If you have any questions after today's visit, please call [phone].

## 2018-08-29 NOTE — PROGRESS NOTES
Roberto Ryan is a 46 y.o. female who complains of dysuria, frequency, urgency for 10 days. Patient denies flank pain, vomiting, fever, unusual vaginal discharge. Patient does not have a history of recurrent UTI. Patient does not have a history of pyelonephritis. Review of Systems  Pertinent items are noted in HPI. Objective:     Visit Vitals    /75 (BP 1 Location: Left arm, BP Patient Position: Sitting)    Pulse 63    Temp 97.9 °F (36.6 °C) (Oral)    Resp 18    Ht 5' 7\" (1.702 m)    Wt 165 lb 12.8 oz (75.2 kg)    SpO2 96%    BMI 25.97 kg/m2     General:  alert, cooperative, no distress   Abdomen: soft, nontender, nondistended, no masses or organomegaly. Back:  CVA tenderness absent   :  defer exam     Laboratory:   Urine dipstick shows positive for RBC's, positive for nitrates and positive for leukocytes. Micro exam: not done. Assessment/Plan:     Acute cystitis     1. ciprofloxacin  2. Maintain adequate hydration  3. May use OTC pyridium as desired, which will turn urine orange/red color  4. Follow up if symptoms not improving, and prn. Encounter Diagnoses   Name Primary?  Acute cystitis with hematuria Yes    Dysuria      Orders Placed This Encounter    AMB POC URINALYSIS DIP STICK AUTO W/O MICRO    ciprofloxacin HCl (CIPRO) 500 mg tablet   . I have discussed the diagnosis with the patient and the intended plan as seen in the above orders. The patient has received an after-visit summary and questions were answered concerning future plans. Patient conveyed understanding of the plan at the time of the visit.     Felicia Watts, MSN, ANP  8/29/2018

## 2018-10-22 ENCOUNTER — OFFICE VISIT (OUTPATIENT)
Dept: FAMILY MEDICINE CLINIC | Age: 48
End: 2018-10-22

## 2018-10-22 VITALS
RESPIRATION RATE: 17 BRPM | BODY MASS INDEX: 26.68 KG/M2 | WEIGHT: 170 LBS | HEIGHT: 67 IN | TEMPERATURE: 97.8 F | SYSTOLIC BLOOD PRESSURE: 110 MMHG | HEART RATE: 80 BPM | OXYGEN SATURATION: 95 % | DIASTOLIC BLOOD PRESSURE: 74 MMHG

## 2018-10-22 DIAGNOSIS — Z12.31 SCREENING MAMMOGRAM, ENCOUNTER FOR: ICD-10-CM

## 2018-10-22 DIAGNOSIS — Z12.11 SCREEN FOR COLON CANCER: ICD-10-CM

## 2018-10-22 DIAGNOSIS — E78.00 ELEVATED CHOLESTEROL: ICD-10-CM

## 2018-10-22 DIAGNOSIS — F31.9 BIPOLAR DEPRESSION (HCC): Primary | Chronic | ICD-10-CM

## 2018-10-22 DIAGNOSIS — F31.9 BIPOLAR DEPRESSION (HCC): Chronic | ICD-10-CM

## 2018-10-22 DIAGNOSIS — M54.41 CHRONIC MIDLINE LOW BACK PAIN WITH BILATERAL SCIATICA: ICD-10-CM

## 2018-10-22 DIAGNOSIS — G89.29 CHRONIC MIDLINE LOW BACK PAIN WITH BILATERAL SCIATICA: ICD-10-CM

## 2018-10-22 DIAGNOSIS — M54.42 CHRONIC MIDLINE LOW BACK PAIN WITH BILATERAL SCIATICA: ICD-10-CM

## 2018-10-22 RX ORDER — OLANZAPINE 5 MG/1
TABLET ORAL
Qty: 90 TAB | Refills: 5 | Status: SHIPPED | OUTPATIENT
Start: 2018-10-22 | End: 2019-01-22 | Stop reason: ALTCHOICE

## 2018-10-22 RX ORDER — BUSPIRONE HYDROCHLORIDE 10 MG/1
10 TABLET ORAL 2 TIMES DAILY
Qty: 60 TAB | Refills: 2 | Status: SHIPPED | OUTPATIENT
Start: 2018-10-22 | End: 2019-01-22 | Stop reason: SDUPTHER

## 2018-10-22 RX ORDER — OLANZAPINE 5 MG/1
5 TABLET ORAL
Qty: 30 TAB | Refills: 5 | Status: SHIPPED | OUTPATIENT
Start: 2018-10-22 | End: 2018-10-22 | Stop reason: SDUPTHER

## 2018-10-22 RX ORDER — DICLOFENAC SODIUM 10 MG/G
GEL TOPICAL 4 TIMES DAILY
Qty: 100 G | Refills: 2 | Status: SHIPPED | OUTPATIENT
Start: 2018-10-22 | End: 2019-10-31 | Stop reason: SDUPTHER

## 2018-10-22 NOTE — PROGRESS NOTES
Chief Complaint   Patient presents with    Knee Pain     Right    Back Pain    Spasms     Bilateral feet     Pt seen in the office today for numerous area of the body for c/o of muscle pain and low back pain. Pt reports that pain goes from legs to knees, to hips and back. Pt also reports foot cramps. Pt reports that she weaned her self off of Abilify due to concern that Abilify was causing muscle cramps and pain. Pt reports some improvement since stopping medication, but still has significant pain. Pt reports that she needs an alternate to Abilify to help with mood and depression. Pt reports that she has been to Dr. Martin Galvin for pain management. Pt needs all new referrals due to new insurance. Subjective: (As above and below)     Chief Complaint   Patient presents with    Knee Pain     Right    Back Pain    Spasms     Bilateral feet    Medication Evaluation     Has self d/c'd Ambilify      she is a 46y.o. year old female who presents for evaluation. Reviewed PmHx, RxHx, FmHx, SocHx, AllgHx and updated in chart. Review of Systems - negative except as listed above    Objective:     Vitals:    10/22/18 1106   BP: 110/74   Pulse: 80   Resp: 17   Temp: 97.8 °F (36.6 °C)   TempSrc: Oral   SpO2: 95%   Weight: 170 lb (77.1 kg)   Height: 5' 7\" (1.702 m)     Physical Examination: General appearance - alert, well appearing, and in no distress  Mental status - normal mood, behavior, speech, dress, motor activity, and thought processes  Mouth - mucous membranes moist, pharynx normal without lesions  Chest - clear to auscultation, no wheezes, rales or rhonchi, symmetric air entry  Heart - normal rate, regular rhythm, normal S1, S2, no murmurs, rubs, clicks or gallops  Back exam - full ROM, pain reports in multiple areas, slow to move from sitting to standing, normal strength  Extremities - peripheral pulses normal, no pedal edema, no clubbing or cyanosis    Assessment/ Plan:   1.  Bipolar depression ([facility] [address]  -trial of zyprexa to replace abilify due to side effects  -trial of buspar to help with anxiety  - OLANZapine (ZYPREXA) 5 mg tablet; Take 1 Tab by mouth nightly. Dispense: 30 Tab; Refill: 5  - busPIRone (BUSPAR) 10 mg tablet; Take 1 Tab by mouth two (2) times a day. Dispense: 60 Tab; Refill: 2    2. Screening mammogram, encounter for  - [facility] BI SCREENING INCL CAD; Future  - REFERRAL TO OBSTETRICS AND GYNECOLOGY    3. Chronic midline low back pain with bilateral sciatica  Voltaren written for as needed use  -refer for resumption of care  - REFERRAL TO PAIN MANAGEMENT    4. Screen for colon cancer  - REFERRAL TO GASTROENTEROLOGY     5. Elevated Cholesterol  -reviewed need to reduce fried foods and start exercise as able, pt reports that she has a program on her [employer] that she plans to start    Follow-up Disposition: As needed or in 3 months  I have discussed the diagnosis with the patient and the intended plan as seen in the above orders. The patient has received an after-visit summary and questions were answered concerning future plans.      Medication Side Effects and Warnings were discussed with patient: yes  Patient Labs were reviewed: yes  Patient Past Records were reviewed:  yes    Mika Zuleta M.D.

## 2019-01-22 ENCOUNTER — OFFICE VISIT (OUTPATIENT)
Dept: FAMILY MEDICINE CLINIC | Age: 49
End: 2019-01-22

## 2019-01-22 VITALS
WEIGHT: 180 LBS | RESPIRATION RATE: 16 BRPM | TEMPERATURE: 97.5 F | HEIGHT: 67 IN | OXYGEN SATURATION: 94 % | DIASTOLIC BLOOD PRESSURE: 77 MMHG | HEART RATE: 67 BPM | BODY MASS INDEX: 28.25 KG/M2 | SYSTOLIC BLOOD PRESSURE: 107 MMHG

## 2019-01-22 DIAGNOSIS — F31.9 BIPOLAR DEPRESSION (HCC): Chronic | ICD-10-CM

## 2019-01-22 RX ORDER — MIRTAZAPINE 7.5 MG/1
TABLET, FILM COATED ORAL
Refills: 0 | COMMUNITY
Start: 2018-12-19 | End: 2019-01-22 | Stop reason: DRUGHIGH

## 2019-01-22 RX ORDER — SERTRALINE HYDROCHLORIDE 25 MG/1
TABLET, FILM COATED ORAL
Refills: 0 | COMMUNITY
Start: 2018-12-19 | End: 2019-01-22 | Stop reason: DRUGHIGH

## 2019-01-22 RX ORDER — QUETIAPINE FUMARATE 200 MG/1
TABLET, FILM COATED ORAL
Qty: 90 TAB | Refills: 0 | Status: SHIPPED | OUTPATIENT
Start: 2019-01-22 | End: 2019-06-19 | Stop reason: SDUPTHER

## 2019-01-22 RX ORDER — BUSPIRONE HYDROCHLORIDE 10 MG/1
10 TABLET ORAL 2 TIMES DAILY
Qty: 180 TAB | Refills: 0 | Status: SHIPPED | OUTPATIENT
Start: 2019-01-22 | End: 2019-06-19 | Stop reason: SDUPTHER

## 2019-01-22 RX ORDER — TOPIRAMATE 25 MG/1
TABLET ORAL
Qty: 180 TAB | Refills: 0 | Status: SHIPPED | OUTPATIENT
Start: 2019-01-22 | End: 2019-06-19 | Stop reason: SDUPTHER

## 2019-01-22 RX ORDER — HYDROXYZINE PAMOATE 50 MG/1
CAPSULE ORAL
Qty: 90 CAP | Refills: 0 | Status: SHIPPED | OUTPATIENT
Start: 2019-01-22

## 2019-01-22 RX ORDER — MIRTAZAPINE 7.5 MG/1
TABLET, FILM COATED ORAL
Qty: 90 TAB | Refills: 0 | Status: SHIPPED | OUTPATIENT
Start: 2019-01-22 | End: 2019-10-31 | Stop reason: SDUPTHER

## 2019-01-22 RX ORDER — QUETIAPINE FUMARATE 200 MG/1
TABLET, FILM COATED ORAL
Refills: 0 | COMMUNITY
Start: 2018-12-19 | End: 2019-01-22 | Stop reason: DRUGHIGH

## 2019-01-22 RX ORDER — SERTRALINE HYDROCHLORIDE 25 MG/1
TABLET, FILM COATED ORAL
Qty: 90 TAB | Refills: 0 | Status: SHIPPED | OUTPATIENT
Start: 2019-01-22 | End: 2019-09-23 | Stop reason: SDUPTHER

## 2019-01-22 RX ORDER — SERTRALINE HYDROCHLORIDE 25 MG/1
TABLET, FILM COATED ORAL
Qty: 30 TAB | Refills: 0 | Status: SHIPPED | OUTPATIENT
Start: 2019-01-22 | End: 2019-01-22 | Stop reason: SDUPTHER

## 2019-01-22 RX ORDER — HYDROXYZINE PAMOATE 50 MG/1
CAPSULE ORAL
Refills: 0 | COMMUNITY
Start: 2018-12-19 | End: 2019-01-22 | Stop reason: DRUGHIGH

## 2019-01-22 NOTE — PROGRESS NOTES
Chief Complaint   Patient presents with    Medication Refill     Sertaline, Mi    Referral Request     Pt states \" I need new referrals with the correct insurance\"     Pt reports that she needs new prescriptions based on medication changes that were made in [facility]. Pt reports that she was referred to the [facility] for psychiatric care. Pt reports that she will run out medication before she can see the [facility]. Subjective: (As above and below)     Chief Complaint   Patient presents with    Medication Refill     Sertaline, Mirtazapine, Hyrdoxyzine, Seroquel 200mg    Referral Request     Pt states \" I need new referrals with the correct insurance\"     she is a 46y.o. year old female who presents for evaluation. Reviewed PmHx, RxHx, FmHx, SocHx, AllgHx and updated in chart. Review of Systems - negative except as listed above    Objective:     Vitals:    01/22/19 1022   BP: 107/77   Pulse: 67   Resp: 16   Temp: 97.5 °F (36.4 °C)   TempSrc: Oral   SpO2: 94%   Weight: 180 lb (81.6 kg)   Height: 5' 7\" (1.702 m)     Physical Examination: General appearance - alert, well appearing, and in no distress  Mental status - normal mood, behavior, speech, dress, motor activity, and thought processes  Mouth - mucous membranes moist, pharynx normal without lesions  Chest - clear to auscultation, no wheezes, rales or rhonchi, symmetric air entry  Heart - normal rate, regular rhythm, normal S1, S2, no murmurs, rubs, clicks or gallops  Musculoskeletal - no joint tenderness, deformity or swelling  Extremities - peripheral pulses normal, no pedal edema, no clubbing or cyanosis    Assessment/ Plan:   1. B[facility] ([facility]  -medications refilled with changes  - topiramate (TOPAMAX) 25 mg tablet; TAKE 1 TABLET BY MOUTH BID  Dispense: 180 Tab; Refill: 0  - busPIRone (BUSPAR) 10 mg tablet; Take 1 Tab by mouth two (2) times a day. Dispense: 180 Tab;  Refill: 0    -referred pt for follow up with mental health provider     Follow-up Disposition: As needed  I have discussed the diagnosis with the patient and the intended plan as seen in the above orders. The patient has received an after-visit summary and questions were answered concerning future plans.      Medication Side Effects and Warnings were discussed with patient: yes  Patient Labs were reviewed: yes  Patient Past Records were reviewed:  yes    Arely Hanson M.D.

## 2019-01-22 NOTE — PROGRESS NOTES
Lindsey Harman is a 46 y.o. female    Chief Complaint   Patient presents with    Medication Refill     Sertaline, Mirtazapine, Hyrdoxyzine, Seroquel 200mg    Referral Request     Pt states \" I need new referrals with the correct insurance\"       Pt request changing her sig on her Topiramate to read BID instead of one tab daily. 1. Have you been to the ER, urgent care clinic since your last visit? Hospitalized since your last visit? No    2. Have you seen or consulted any other health care providers outside of the [address] [place] [address] since your last visit? Include any pap smears or colon screening.  No    Visit Vitals  /77 (BP 1 Location: Left arm, BP Patient Position: Sitting)   Pulse 67   Temp 97.5 °F (36.4 °C) (Oral)   Resp 16   Ht 5' 7\" (1.702 m)   Wt 180 lb (81.6 kg)   SpO2 94%   BMI 28.19 kg/m²

## 2019-02-21 ENCOUNTER — DOCUMENTATION ONLY (OUTPATIENT)
Dept: FAMILY MEDICINE CLINIC | Age: 49
End: 2019-02-21

## 2019-02-21 NOTE — PROGRESS NOTES
Patient's request to have records sent to Dr Amor Sams was faxed to [facility] @ [phone] to be processed

## 2019-03-07 ENCOUNTER — OFFICE VISIT (OUTPATIENT)
Dept: FAMILY MEDICINE CLINIC | Age: 49
End: 2019-03-07

## 2019-03-07 VITALS
WEIGHT: 176 LBS | HEIGHT: 67 IN | TEMPERATURE: 97.7 F | OXYGEN SATURATION: 96 % | BODY MASS INDEX: 27.62 KG/M2 | RESPIRATION RATE: 18 BRPM | HEART RATE: 71 BPM | SYSTOLIC BLOOD PRESSURE: 119 MMHG | DIASTOLIC BLOOD PRESSURE: 83 MMHG

## 2019-03-07 DIAGNOSIS — F31.9 BIPOLAR DEPRESSION (HCC): Chronic | ICD-10-CM

## 2019-03-07 DIAGNOSIS — Z12.31 SCREENING MAMMOGRAM, ENCOUNTER FOR: ICD-10-CM

## 2019-03-07 DIAGNOSIS — N89.8 VAGINAL ODOR: Primary | ICD-10-CM

## 2019-03-07 DIAGNOSIS — Z12.11 SCREENING FOR COLON CANCER: ICD-10-CM

## 2019-03-07 RX ORDER — CHOLECALCIFEROL (VITAMIN D3) 125 MCG
CAPSULE ORAL
COMMUNITY
End: 2019-06-19 | Stop reason: SDUPTHER

## 2019-03-07 RX ORDER — METRONIDAZOLE 500 MG/1
500 TABLET ORAL 2 TIMES DAILY
Qty: 14 TAB | Refills: 0 | Status: SHIPPED | OUTPATIENT
Start: 2019-03-07 | End: 2019-03-14

## 2019-03-07 RX ORDER — QUETIAPINE FUMARATE 100 MG/1
100 TABLET, FILM COATED ORAL
COMMUNITY
End: 2019-06-19 | Stop reason: SDUPTHER

## 2019-03-07 NOTE — PROGRESS NOTES
Chief Complaint   Patient presents with    Form Completion     pt would like to discuss [place] paperwork     1. Have you been to the ER, urgent care clinic since your last visit? Hospitalized since your last visit? No    2. Have you seen or consulted any other health care providers outside of the [facility] since your last visit? Include any pap smears or colon screening.  No    Visit Vitals  /83 (BP 1 Location: Left arm, BP Patient Position: Sitting)   Pulse 71   Temp 97.7 °F (36.5 °C) (Oral)   Resp 18   Ht 5' 7\" (1.702 m)   Wt 176 lb (79.8 kg)   SpO2 96%   BMI 27.57 kg/m²

## 2019-03-07 NOTE — PROGRESS NOTES
Chief Complaint   Patient presents with    Form Completion     pt would like to discuss FMLA paperwork     Pt needs a paper to dispense student loans due to permanent disability. Pt also reports an ongoing vaginal odor. Subjective: (As above and below)     Chief Complaint   Patient presents with    Form Completion     pt would like to discuss FMLA paperwork     she is a 44y.o. year old female who presents for evaluation. Reviewed PmHx, RxHx, FmHx, SocHx, AllgHx and updated in chart. Review of Systems - negative except as listed above    Objective:     Vitals:    03/07/19 1120   BP: 119/83   Pulse: 71   Resp: 18   Temp: 97.7 °F (36.5 °C)   TempSrc: Oral   SpO2: 96%   Weight: 176 lb (79.8 kg)   Height: 5' 7\" (1.702 m)     Physical Examination: General appearance - alert, well appearing, and in no distress  Mental status - alert, oriented to person, place, and time  Mouth - mucous membranes moist, pharynx normal without lesions  Chest - clear to auscultation, no wheezes, rales or rhonchi, symmetric air entry  Abdomen - soft, nontender, nondistended, no masses or organomegaly  Musculoskeletal - no joint tenderness, deformity or swelling    Assessment/ Plan:   1. Vaginal odor  -check swab, treat for suspected BV  - NUSWAB VAGINITIS PLUS    2. Screening mammogram, encounter for  - [facility] 3D DEMETRIO W MAMMO BI SCREENING INCL CAD; Future    3. Screening for colon cancer  - REFERRAL TO GASTROENTEROLOGY    4. [facility] ([facility]  -follow up with psych as discussed, forms completed. Follow-up Disposition: As needed  I have discussed the diagnosis with the patient and the intended plan as seen in the above orders. The patient has received an after-visit summary and questions were answered concerning future plans.      Medication Side Effects and Warnings were discussed with patient: yes  Patient Labs were reviewed: yes  Patient Past Records were reviewed:  yes    Ellen Daniels M.D.

## 2019-03-11 LAB
A VAGINAE DNA VAG QL NAA+PROBE: ABNORMAL SCORE
BVAB2 DNA VAG QL NAA+PROBE: ABNORMAL SCORE
C ALBICANS DNA VAG QL NAA+PROBE: NEGATIVE
C GLABRATA DNA VAG QL NAA+PROBE: NEGATIVE
C TRACH RRNA SPEC QL NAA+PROBE: NEGATIVE
MEGA1 DNA VAG QL NAA+PROBE: ABNORMAL SCORE
N GONORRHOEA RRNA SPEC QL NAA+PROBE: NEGATIVE
T VAGINALIS RRNA SPEC QL NAA+PROBE: NEGATIVE

## 2019-04-04 DIAGNOSIS — Z12.31 SCREENING MAMMOGRAM, ENCOUNTER FOR: ICD-10-CM

## 2019-04-30 ENCOUNTER — OFFICE VISIT (OUTPATIENT)
Dept: FAMILY MEDICINE CLINIC | Age: 49
End: 2019-04-30

## 2019-04-30 VITALS
WEIGHT: 175.6 LBS | DIASTOLIC BLOOD PRESSURE: 70 MMHG | HEART RATE: 89 BPM | TEMPERATURE: 98.3 F | RESPIRATION RATE: 18 BRPM | BODY MASS INDEX: 27.56 KG/M2 | SYSTOLIC BLOOD PRESSURE: 103 MMHG | HEIGHT: 67 IN | OXYGEN SATURATION: 93 %

## 2019-04-30 DIAGNOSIS — Z88.9 MULTIPLE ALLERGIES: Primary | ICD-10-CM

## 2019-04-30 DIAGNOSIS — F31.9 BIPOLAR DEPRESSION (HCC): Chronic | ICD-10-CM

## 2019-04-30 RX ORDER — LORATADINE 10 MG/1
10 TABLET ORAL DAILY
Qty: 30 TAB | Refills: 11 | Status: SHIPPED | OUTPATIENT
Start: 2019-04-30 | End: 2020-04-24

## 2019-04-30 NOTE — PROGRESS NOTES
Chief Complaint   Patient presents with    URI     cough,congestion X 5 days    Itchy Eye     itchy nose, ears    Chills     sweats in sleep    Depression     1. Have you been to the ER, urgent care clinic since your last visit? Hospitalized since your last visit? No    2. Have you seen or consulted any other health care providers outside of the [address] [place] [address] since your last visit? Include any pap smears or colon screening. No      Chief Complaint   Patient presents with    URI     cough,congestion X 5 days    Itchy Eye     itchy nose, ears    Chills     sweats in sleep    Depression     She is a 44 y.o. female who presents for evalution. Reviewed PmHx, RxHx, FmHx, SocHx, AllgHx and updated and dated in the chart. Patient Active Problem List    Diagnosis    Bipolar depression ([facility] [address]       Review of Systems - negative except as listed above in the HPI    Objective:     Vitals:    04/30/19 1357   BP: 103/70   Pulse: 89   Resp: 18   Temp: 98.3 °F (36.8 °C)   TempSrc: Oral   SpO2: 93%   Weight: 175 lb 9.6 oz (79.7 kg)   Height: 5' 7\" (1.702 m)     Physical Examination: General appearance - alert, well appearing, and in no distress  Ears - bilateral TM's and external ear canals normal  Nose - clear rhinorrhea  Mouth - mucous membranes moist, pharynx normal without lesions  Chest - clear to auscultation, no wheezes, rales or rhonchi, symmetric air entry  Heart - normal rate, regular rhythm, normal S1, S2, no murmurs, rubs, clicks or gallops      Assessment/ Plan:   Diagnoses and all orders for this visit:    1. Multiple allergies  -     loratadine (CLARITIN) 10 mg tablet; Take 1 Tab by mouth daily for 360 days. 2. Bipolar depression ([facility]  -stable        Follow-up and Dispositions    · Return if symptoms worsen or fail to improve. I have discussed the diagnosis with the patient and the intended plan as seen in the above orders. The patient understands and agrees with the plan.  The patient has received an after-visit summary and questions were answered concerning future plans. Medication Side Effects and Warnings were discussed with patient  Patient Labs were reviewed and or requested:  Patient Past Records were reviewed and or requested    Barbara Rivers M.D. There are no Patient Instructions on file for this visit.

## 2019-05-14 ENCOUNTER — TELEPHONE (OUTPATIENT)
Dept: FAMILY MEDICINE CLINIC | Age: 49
End: 2019-05-14

## 2019-05-14 NOTE — TELEPHONE ENCOUNTER
Attempted to call patient however MB is full. Unable to LVM at this time. Patient can go to the [employer] and print off a medical release form.

## 2019-05-14 NOTE — TELEPHONE ENCOUNTER
Pt needs a medical records release form faxed to her. Pt stated that she will be faxing a copy of her photo ID. Pt would like a call back. Thanks!   Phone [phone]  Fax# [phone]

## 2019-05-21 ENCOUNTER — DOCUMENTATION ONLY (OUTPATIENT)
Dept: FAMILY MEDICINE CLINIC | Age: 49
End: 2019-05-21

## 2019-05-21 NOTE — PROGRESS NOTES
Tried to fax office notes to Dr Kristine alvarez per patient medical release. Faxed to [phone] & [phone] many times but no confirmation received. I tried to call Dr Kristine alvarez but no answer. Called patient @[phone] & could not lm because vmb was full.  Scanned Medical release in chart

## 2019-05-30 NOTE — TELEPHONE ENCOUNTER
Regarding: Dr. Ellen Briseno  Patient called to request a referral for pain management. Practice name is [facility] [place] [facility]. Phone number is [phone]. Fax number is [phone]. Doctor name is Dr. dAolfo Dougherty. Best contact number for patient is [phone] or [phone].  If no answer please leave a message.

## 2019-06-10 ENCOUNTER — DOCUMENTATION ONLY (OUTPATIENT)
Dept: FAMILY MEDICINE CLINIC | Age: 49
End: 2019-06-10

## 2019-06-10 NOTE — PROGRESS NOTES
Patient called and notified that records we are trying to fax since last week are not going through to Dr. Tristian Moffett. We had called their office x 2 last week and verified the right fax number. Patient will call their office tomorrow for an alternative fax number or come to office and  records to take there herself.

## 2019-06-19 ENCOUNTER — OFFICE VISIT (OUTPATIENT)
Dept: FAMILY MEDICINE CLINIC | Age: 49
End: 2019-06-19

## 2019-06-19 VITALS
HEART RATE: 101 BPM | HEIGHT: 67 IN | OXYGEN SATURATION: 94 % | TEMPERATURE: 98 F | BODY MASS INDEX: 29.35 KG/M2 | RESPIRATION RATE: 18 BRPM | WEIGHT: 187 LBS | SYSTOLIC BLOOD PRESSURE: 122 MMHG | DIASTOLIC BLOOD PRESSURE: 87 MMHG

## 2019-06-19 DIAGNOSIS — M70.62 TROCHANTERIC BURSITIS OF LEFT HIP: ICD-10-CM

## 2019-06-19 DIAGNOSIS — E55.9 VITAMIN D DEFICIENCY: ICD-10-CM

## 2019-06-19 DIAGNOSIS — M54.5 CHRONIC LEFT-SIDED LOW BACK PAIN, WITH SCIATICA PRESENCE UNSPECIFIED: ICD-10-CM

## 2019-06-19 DIAGNOSIS — G89.29 CHRONIC LEFT-SIDED LOW BACK PAIN, WITH SCIATICA PRESENCE UNSPECIFIED: ICD-10-CM

## 2019-06-19 DIAGNOSIS — N18.1 STAGE 1 CHRONIC KIDNEY DISEASE: ICD-10-CM

## 2019-06-19 DIAGNOSIS — G89.29 CHRONIC LEFT-SIDED LOW BACK PAIN, WITH SCIATICA PRESENCE UNSPECIFIED: Primary | ICD-10-CM

## 2019-06-19 DIAGNOSIS — M54.5 CHRONIC LEFT-SIDED LOW BACK PAIN, WITH SCIATICA PRESENCE UNSPECIFIED: Primary | ICD-10-CM

## 2019-06-19 DIAGNOSIS — K21.9 GASTROESOPHAGEAL REFLUX DISEASE, ESOPHAGITIS PRESENCE NOT SPECIFIED: ICD-10-CM

## 2019-06-19 DIAGNOSIS — F31.9 BIPOLAR DEPRESSION (HCC): Chronic | ICD-10-CM

## 2019-06-19 RX ORDER — QUETIAPINE FUMARATE 200 MG/1
TABLET, FILM COATED ORAL
Qty: 90 TAB | Refills: 3 | Status: SHIPPED | OUTPATIENT
Start: 2019-06-19

## 2019-06-19 RX ORDER — QUETIAPINE FUMARATE 100 MG/1
100 TABLET, FILM COATED ORAL
Qty: 90 TAB | Refills: 3 | Status: SHIPPED | OUTPATIENT
Start: 2019-06-19 | End: 2020-07-07

## 2019-06-19 RX ORDER — PHENOL/SODIUM PHENOLATE
20 AEROSOL, SPRAY (ML) MUCOUS MEMBRANE DAILY
Qty: 90 TAB | Refills: 3 | Status: SHIPPED | OUTPATIENT
Start: 2019-06-19 | End: 2019-06-20

## 2019-06-19 RX ORDER — PHENOL/SODIUM PHENOLATE
20 AEROSOL, SPRAY (ML) MUCOUS MEMBRANE DAILY
COMMUNITY
End: 2019-06-19 | Stop reason: SDUPTHER

## 2019-06-19 RX ORDER — TOPIRAMATE 25 MG/1
TABLET ORAL
Qty: 180 TAB | Refills: 3 | Status: SHIPPED | OUTPATIENT
Start: 2019-06-19

## 2019-06-19 RX ORDER — CALCITRIOL 0.5 UG/1
CAPSULE ORAL
Qty: 90 CAP | Refills: 3 | Status: SHIPPED | OUTPATIENT
Start: 2019-06-19 | End: 2020-08-06

## 2019-06-19 RX ORDER — TIZANIDINE 4 MG/1
4 TABLET ORAL
Qty: 270 TAB | Refills: 3 | Status: SHIPPED | OUTPATIENT
Start: 2019-06-19

## 2019-06-19 RX ORDER — TIZANIDINE 4 MG/1
4 TABLET ORAL
COMMUNITY
End: 2019-06-19 | Stop reason: SDUPTHER

## 2019-06-19 RX ORDER — TRAZODONE HYDROCHLORIDE 50 MG/1
50 TABLET ORAL
Qty: 90 TAB | Refills: 3 | Status: SHIPPED | OUTPATIENT
Start: 2019-06-19

## 2019-06-19 RX ORDER — PREDNISONE 10 MG/1
TABLET ORAL
Qty: 21 TAB | Refills: 0 | Status: SHIPPED | OUTPATIENT
Start: 2019-06-19 | End: 2019-07-23 | Stop reason: ALTCHOICE

## 2019-06-19 RX ORDER — SULINDAC 200 MG/1
200 TABLET ORAL 2 TIMES DAILY
Qty: 60 TAB | Refills: 0 | Status: SHIPPED | OUTPATIENT
Start: 2019-06-19 | End: 2019-07-23

## 2019-06-19 RX ORDER — BUSPIRONE HYDROCHLORIDE 30 MG/1
15 TABLET ORAL 3 TIMES DAILY
Qty: 270 TAB | Refills: 3 | Status: SHIPPED | OUTPATIENT
Start: 2019-06-19

## 2019-06-19 RX ORDER — SULINDAC 200 MG/1
200 TABLET ORAL 2 TIMES DAILY
Qty: 60 TAB | Refills: 0 | Status: SHIPPED | OUTPATIENT
Start: 2019-06-19 | End: 2019-06-19 | Stop reason: SDUPTHER

## 2019-06-19 RX ORDER — TRAZODONE HYDROCHLORIDE 50 MG/1
TABLET ORAL
COMMUNITY
End: 2019-06-19 | Stop reason: SDUPTHER

## 2019-06-19 RX ORDER — CHOLECALCIFEROL (VITAMIN D3) 125 MCG
1 CAPSULE ORAL DAILY
Qty: 90 TAB | Refills: 3 | Status: SHIPPED | OUTPATIENT
Start: 2019-06-19

## 2019-06-19 NOTE — PROGRESS NOTES
Luis Webster is a 44 y.o. female   Chief Complaint   Patient presents with    Back Pain    Leg Pain    Medication Refill    pt states she is waking up every night with pain in her L leg states in her hip knee and ankle region. States worse at night and can be throbbing at times. Pt wrapped up her knees and this did help with her knee pain. Pt is not taking anything for this. Pt has an appt with pain management on July 11th. Pt has used diclofenac topical without relief. Pt also requesting refills on her meds. PPI is working well for her gerd. Pt on calcitriol and she does not know why. She is taking a vit d supplement and will recheck level today. Will also check a PTH and calcium level. Reports bipolar well controlled on seroquel bid. she is a 44y.o. year old female who presents for evalution. Reviewed PmHx, RxHx, FmHx, SocHx, AllgHx and updated and dated in the chart. Review of Systems - negative except as listed above in the HPI    Objective:     Vitals:    06/19/19 0818   BP: 122/87   Pulse: (!) 101   Resp: 18   Temp: 98 °F (36.7 °C)   TempSrc: Oral   SpO2: 94%   Weight: 187 lb (84.8 kg)   Height: 5' 7\" (1.702 m)       Current Outpatient Medications   Medication Sig    busPIRone (BUSPAR) 30 mg tablet Take 0.5 Tabs by mouth three (3) times daily.  calcitRIOL (ROCALTROL) 0.5 mcg capsule TAKE 1 CAPSULE BY MOUTH DAILY    cholecalciferol, vitamin D3, (VITAMIN D3) 2,000 unit tab Take 1 Tab by mouth daily.  tiZANidine (ZANAFLEX) 4 mg tablet Take 1 Tab by mouth three (3) times daily as needed (muscle spasm).  topiramate (TOPAMAX) 25 mg tablet TAKE 1 TABLET BY MOUTH BID    traZODone (DESYREL) 50 mg tablet Take 1 Tab by mouth nightly.  QUEtiapine (SEROQUEL) 200 mg tablet TK 1 T PO  Daily FOR MOOD STABILIZATION    QUEtiapine (SEROQUEL) 100 mg tablet Take 1 Tab by mouth nightly.  Omeprazole delayed release (PRILOSEC D/R) 20 mg tablet Take 1 Tab by mouth daily.     predniSONE (STERAPRED DS) 10 mg dose pack See administration instruction per 10mg dose pack    sulindac (CLINORIL) 200 mg tablet Take 1 Tab by mouth two (2) times a day. Do not start until after steroid pack    loratadine (CLARITIN) 10 mg tablet Take 1 Tab by mouth daily for 360 days.  mirtazapine (REMERON) 7.5 mg tablet TK 1 T PO  QHS FOR SLEEP    hydrOXYzine pamoate (VISTARIL) 50 mg capsule TK ONE C PO  Q 6 H PRF ANXIETY    sertraline (ZOLOFT) 25 mg tablet TAKE 1 TABLET BY MOUTH EVERY DAY FOR DEPRESSION    diclofenac (VOLTAREN) 1 % gel Apply  to affected area four (4) times daily.  ferrous sulfate 325 mg (65 mg iron) tablet Take 1 Tab by mouth Daily (before breakfast). No current facility-administered medications for this visit. Physical Examination: General appearance - alert, well appearing, and in no distress  Chest - clear to auscultation, no wheezes, rales or rhonchi, symmetric air entry  Heart - normal rate, regular rhythm, normal S1, S2, no murmurs, rubs, clicks or gallops  Musculoskeletal - ttp over L lateral hip over trochanter region, no edema, L knee without ttp no instability no erythema L ankle no edema no ttp no erythema      Assessment/ Plan:   Diagnoses and all orders for this visit:    1. Chronic left-sided low back pain, with sciatica presence unspecified  -     predniSONE (STERAPRED DS) 10 mg dose pack; See administration instruction per 10mg dose pack  -     sulindac (CLINORIL) 200 mg tablet; Take 1 Tab by mouth two (2) times a day. Do not start until after steroid pack  Last MRI 2017 on file ever,ke yahir, pt has pain management appt next month  2. Bipolar depression (H[facility])  -     busPIRone (BUSPAR) 30 mg tablet; Take 0.5 Tabs by mouth three (3) times daily. -     calcitRIOL (ROCALTROL) 0.5 mcg capsule; TAKE 1 CAPSULE BY MOUTH DAILY  -     topiramate (TOPAMAX) 25 mg tablet; TAKE 1 TABLET BY MOUTH BID    3.  Gastroesophageal reflux disease, esophagitis presence not specified  -     Omeprazole delayed release (PRILOSEC D/R) 20 mg tablet; Take 1 Tab by mouth daily. 4. Stage 1 chronic kidney disease  -     METABOLIC PANEL, BASIC    5. Vitamin D deficiency  -     VITAMIN D, 25 HYDROXY  -     PTH INTACT    6. Trochanteric bursitis of left hip  -     predniSONE (STERAPRED DS) 10 mg dose pack; See administration instruction per 10mg dose pack  -     sulindac (CLINORIL) 200 mg tablet; Take 1 Tab by mouth two (2) times a day. Do not start until after steroid pack    Other orders  -     cholecalciferol, vitamin D3, (VITAMIN D3) 2,000 unit tab; Take 1 Tab by mouth daily. -     tiZANidine (ZANAFLEX) 4 mg tablet; Take 1 Tab by mouth three (3) times daily as needed (muscle spasm). -     traZODone (DESYREL) 50 mg tablet; Take 1 Tab by mouth nightly. -     QUEtiapine (SEROQUEL) 200 mg tablet; TK 1 T PO  Daily FOR MOOD STABILIZATION  -     QUEtiapine (SEROQUEL) 100 mg tablet; Take 1 Tab by mouth nightly. Follow-up and Dispositions    · Return if symptoms worsen or fail to improve. I have discussed the diagnosis with the patient and the intended plan as seen in the above orders. The patient has received an after-visit summary and questions were answered concerning future plans. Pt conveyed understanding of plan.     Medication Side Effects and Warnings were discussed with patient      Monika Bynum DO

## 2019-06-19 NOTE — PROGRESS NOTES
Chief Complaint   Patient presents with    Back Pain    Leg Pain    Medication Refill       Patient presents in office today with c/o back and leg pain. State that it keeps her up at night. Also needs a refills on her medications. No other concerns. 1. Have you been to the ER, urgent care clinic since your last visit? Hospitalized since your last visit? No    2. Have you seen or consulted any other health care providers outside of the [address] [place] [address] since your last visit? Include any pap smears or colon screening.  No    Learning Assessment 2/13/2018   PRIMARY LEARNER Patient   HIGHEST LEVEL OF EDUCATION - PRIMARY LEARNER  GRADUATED HIGH SCHOOL OR GED   BARRIERS PRIMARY LEARNER NONE   CO-LEARNER CAREGIVER No   PRIMARY LANGUAGE ENGLISH   LEARNER PREFERENCE PRIMARY DEMONSTRATION   ANSWERED BY patient   RELATIONSHIP SELF

## 2019-06-19 NOTE — PATIENT INSTRUCTIONS
A Healthy Lifestyle: Care Instructions  Your Care Instructions    A healthy lifestyle can help you feel good, stay at a healthy weight, and have plenty of energy for both work and play. A healthy lifestyle is something you can share with your whole family. A healthy lifestyle also can lower your risk for serious health problems, such as high blood pressure, heart disease, and diabetes. You can follow a few steps listed below to improve your health and the health of your family. Follow-up care is a key part of your treatment and safety. Be sure to make and go to all appointments, and call your doctor if you are having problems. It's also a good idea to know your test results and keep a list of the medicines you take. How can you care for yourself at home? · Do not eat too much sugar, fat, or fast foods. You can still have dessert and treats now and then. The goal is moderation. · Start small to improve your eating habits. Pay attention to portion sizes, drink less juice and soda pop, and eat more fruits and vegetables. ? Eat a healthy amount of food. A 3-ounce serving of meat, for example, is about the size of a deck of cards. Fill the rest of your plate with vegetables and whole grains. ? Limit the amount of soda and sports drinks you have every day. Drink more water when you are thirsty. ? Eat at least 5 servings of fruits and vegetables every day. It may seem like a lot, but it is not hard to reach this goal. A serving or helping is 1 piece of fruit, 1 cup of vegetables, or 2 cups of leafy, raw vegetables. Have an apple or some carrot sticks as an afternoon snack instead of a candy bar. Try to have fruits and/or vegetables at every meal.  · Make exercise part of your daily routine. You may want to start with simple activities, such as walking, bicycling, or slow swimming. Try to be active 30 to 60 minutes every day. You do not need to do all 30 to 60 minutes all at once.  For example, you can exercise 3 times a day for 10 or 20 minutes. Moderate exercise is safe for most people, but it is always a good idea to talk to your doctor before starting an exercise program.  · Keep moving. [employer] the lawn, work in the garden, or [employer]. Take the stairs instead of the elevator at work. · If you smoke, quit. People who smoke have an increased risk for heart attack, stroke, cancer, and other lung illnesses. Quitting is hard, but there are ways to boost your chance of quitting tobacco for good. ? Use nicotine gum, patches, or lozenges. ? Ask your doctor about stop-smoking programs and medicines. ? Keep trying. In addition to reducing your risk of diseases in the future, you will notice some benefits soon after you stop using tobacco. If you have shortness of breath or asthma symptoms, they will likely get better within a few weeks after you quit. · Limit how much alcohol you drink. Moderate amounts of alcohol (up to 2 drinks a day for men, 1 drink a day for women) are okay. But drinking too much can lead to liver problems, high blood pressure, and other health problems. Family health  If you have a family, there are many things you can do together to improve your health. · Eat meals together as a family as often as possible. · Eat healthy foods. This includes fruits, vegetables, lean meats and dairy, and whole grains. · Include your family in your fitness plan. Most people think of activities such as jogging or tennis as the way to fitness, but there are many ways you and your family can be more active. Anything that makes you breathe hard and gets your heart pumping is exercise. Here are some tips:  ? Walk to do errands or to take your child to school or the bus.  ? Go for a family bike ride after dinner instead of watching TV. Where can you learn more? Go to http://ilia-miguel angel.info/. Enter F687 in the search box to learn more about \"A Healthy Lifestyle: Care Instructions. \"  Current as of: September 11, 2018  Content Version: 11.9  © 0332-3202 [employer]. Care instructions adapted under license by [employer] (which disclaims liability or warranty for this information). If you have questions about a medical condition or this instruction, always ask your healthcare professional. [address] any warranty or liability for your use of this information.

## 2019-06-20 LAB
25(OH)D3+25(OH)D2 SERPL-MCNC: NORMAL NG/ML
BUN SERPL-MCNC: 22 MG/DL (ref 6–24)
BUN/CREAT SERPL: 17 (ref 9–23)
CALCIUM SERPL-MCNC: 10 MG/DL (ref 8.7–10.2)
CHLORIDE SERPL-SCNC: 112 MMOL/L (ref 96–106)
CO2 SERPL-SCNC: 16 MMOL/L (ref 20–29)
CREAT SERPL-MCNC: 1.3 MG/DL (ref 0.57–1)
GLUCOSE SERPL-MCNC: 132 MG/DL (ref 65–99)
INTERPRETATION: NORMAL
POTASSIUM SERPL-SCNC: 4.1 MMOL/L (ref 3.5–5.2)
PTH-INTACT SERPL-MCNC: NORMAL PG/ML
SODIUM SERPL-SCNC: 146 MMOL/L (ref 134–144)

## 2019-06-20 RX ORDER — SULINDAC 200 MG/1
TABLET ORAL
Qty: 180 TAB | Refills: 0 | Status: SHIPPED | OUTPATIENT
Start: 2019-06-20 | End: 2019-07-23

## 2019-06-20 RX ORDER — OMEPRAZOLE 20 MG/1
20 CAPSULE, DELAYED RELEASE ORAL DAILY
Qty: 90 CAP | Refills: 3 | Status: SHIPPED | OUTPATIENT
Start: 2019-06-20 | End: 2020-08-06

## 2019-06-20 NOTE — PROGRESS NOTES
Renal function declined f/u 2-4 weeks for recheck, improve hydration.   PTH and vit D were cancelled and I do not know why, we will redraw these at f/u

## 2019-06-24 ENCOUNTER — TELEPHONE (OUTPATIENT)
Dept: FAMILY MEDICINE CLINIC | Age: 49
End: 2019-06-24

## 2019-06-24 NOTE — TELEPHONE ENCOUNTER
----- Message from Rhona Jaquez Page sent at 6/24/2019 10:46 AM EDT -----  Regarding: Dr. Giacomo Sow  Pt is requesting a return call, regarding RX for steroids. Best contact number [phone].

## 2019-06-25 NOTE — TELEPHONE ENCOUNTER
Called and spoke with patient. She states that she asked the pharmacy how to take dose pack and they told her to call the office and ask. Asked patient to open to package and she found the directions.

## 2019-06-25 NOTE — TELEPHONE ENCOUNTER
----- Message from Celine Christie sent at 6/25/2019  1:57 PM EDT -----  Regarding: Dr. Meera Nelson / Telephone   Pt is requesting to speak to the nurse regarding how to take her medication.   \"Steroids\"     Pt's best contact: [phone]

## 2019-07-05 ENCOUNTER — TELEPHONE (OUTPATIENT)
Dept: FAMILY MEDICINE CLINIC | Age: 49
End: 2019-07-05

## 2019-07-23 ENCOUNTER — OFFICE VISIT (OUTPATIENT)
Dept: FAMILY MEDICINE CLINIC | Age: 49
End: 2019-07-23

## 2019-07-23 VITALS
HEIGHT: 67 IN | HEART RATE: 95 BPM | SYSTOLIC BLOOD PRESSURE: 107 MMHG | TEMPERATURE: 97.7 F | DIASTOLIC BLOOD PRESSURE: 72 MMHG | OXYGEN SATURATION: 92 % | RESPIRATION RATE: 14 BRPM | BODY MASS INDEX: 30.57 KG/M2 | WEIGHT: 194.8 LBS

## 2019-07-23 DIAGNOSIS — Z71.6 ENCOUNTER FOR SMOKING CESSATION COUNSELING: ICD-10-CM

## 2019-07-23 DIAGNOSIS — M54.42 CHRONIC BILATERAL LOW BACK PAIN WITH LEFT-SIDED SCIATICA: ICD-10-CM

## 2019-07-23 DIAGNOSIS — G89.29 CHRONIC BILATERAL LOW BACK PAIN WITH LEFT-SIDED SCIATICA: ICD-10-CM

## 2019-07-23 DIAGNOSIS — R79.89 ELEVATED SERUM CREATININE: Primary | ICD-10-CM

## 2019-07-23 RX ORDER — KETOROLAC TROMETHAMINE 30 MG/ML
60 INJECTION, SOLUTION INTRAMUSCULAR; INTRAVENOUS ONCE
Qty: 1 VIAL | Refills: 0
Start: 2019-07-23 | End: 2019-07-23

## 2019-07-23 RX ORDER — KETOROLAC TROMETHAMINE 10 MG/1
10 TABLET, FILM COATED ORAL
Qty: 20 TAB | Refills: 0 | Status: SHIPPED | OUTPATIENT
Start: 2019-07-23

## 2019-07-23 RX ORDER — IBUPROFEN 200 MG
1 TABLET ORAL EVERY 24 HOURS
Qty: 30 PATCH | Refills: 0 | Status: SHIPPED | OUTPATIENT
Start: 2019-07-23 | End: 2019-08-22

## 2019-07-23 NOTE — PROGRESS NOTES
Sher Barron is a 44 y.o. female   Chief Complaint   Patient presents with    Medication Evaluation     Sulindac. pt states medication isn't helping w/pain    Pt would like something to help quit smoking. She is currently smoking 1 ppd. Pt also reports she missed her pain management appt because she is \"so busy. \"  Pt states she is in severe pain and has a hx of spondylolisthesis and has seen interventional and a recent note is available pt was positive for cocaine on her utox and they would not Rx her. Pt states that she is no longer using drgs. Reports pain is an 8/10. Will give toradol injection and 5 days of medication. Pt to f/u with ortho spine and pain management. she is a 44y.o. year old female who presents for evalution. Reviewed PmHx, RxHx, FmHx, SocHx, AllgHx and updated and dated in the chart. Review of Systems - negative except as listed above in the HPI    Objective:     Vitals:    07/23/19 1416   BP: 107/72   Pulse: 95   Resp: 14   Temp: 97.7 °F (36.5 °C)   TempSrc: Oral   SpO2: 92%   Weight: 194 lb 12.8 oz (88.4 kg)   Height: 5' 7\" (1.702 m)       Current Outpatient Medications   Medication Sig    ketorolac tromethamine (TORADOL) 60 mg/2 mL soln 2 mL by IntraMUSCular route once for 1 dose.  ketorolac (TORADOL) 10 mg tablet Take 1 Tab by mouth every six (6) hours as needed for Pain.  nicotine (NICODERM CQ) 21 mg/24 hr 1 Patch by TransDERmal route every twenty-four (24) hours for 30 days.  omeprazole (PRILOSEC) 20 mg capsule Take 1 Cap by mouth daily.  busPIRone (BUSPAR) 30 mg tablet Take 0.5 Tabs by mouth three (3) times daily.  calcitRIOL (ROCALTROL) 0.5 mcg capsule TAKE 1 CAPSULE BY MOUTH DAILY    cholecalciferol, vitamin D3, (VITAMIN D3) 2,000 unit tab Take 1 Tab by mouth daily.  tiZANidine (ZANAFLEX) 4 mg tablet Take 1 Tab by mouth three (3) times daily as needed (muscle spasm).     topiramate (TOPAMAX) 25 mg tablet TAKE 1 TABLET BY MOUTH BID    traZODone (DESYREL) 50 mg tablet Take 1 Tab by mouth nightly.  QUEtiapine (SEROQUEL) 200 mg tablet TK 1 T PO  Daily FOR MOOD STABILIZATION    QUEtiapine (SEROQUEL) 100 mg tablet Take 1 Tab by mouth nightly.  loratadine (CLARITIN) 10 mg tablet Take 1 Tab by mouth daily for 360 days. (Patient taking differently: Take 10 mg by mouth daily as needed.)    mirtazapine (REMERON) 7.5 mg tablet TK 1 T PO  QHS FOR SLEEP    hydrOXYzine pamoate (VISTARIL) 50 mg capsule TK ONE C PO  Q 6 H PRF ANXIETY    sertraline (ZOLOFT) 25 mg tablet TAKE 1 TABLET BY MOUTH EVERY DAY FOR DEPRESSION    diclofenac (VOLTAREN) 1 % gel Apply  to affected area four (4) times daily.  ferrous sulfate 325 mg (65 mg iron) tablet Take 1 Tab by mouth Daily (before breakfast). No current facility-administered medications for this visit. Physical Examination: General appearance - alert, well appearing, and in no distress  Mental status - alert, oriented to person, place, and time  Chest - clear to auscultation, no wheezes, rales or rhonchi, symmetric air entry  Heart - normal rate, regular rhythm, normal S1, S2, no murmurs, rubs, clicks or gallops  Back exam - mild ttp over L paraspinal, mild pain elicited with a seated SLR on the L      Assessment/ Plan:   Diagnoses and all orders for this visit:    1. Elevated serum creatinine  -     RENAL FUNCTION PANEL    2. Chronic bilateral low back pain with left-sided sciatica  -     REFERRAL TO ORTHOPEDICS  -     ketorolac tromethamine (TORADOL) 60 mg/2 mL soln; 2 mL by IntraMUSCular route once for 1 dose. -     KETOROLAC TROMETHAMINE INJ  -     LA THER/PROPH/DIAG INJECTION, SUBCUT/IM  -     ketorolac (TORADOL) 10 mg tablet; Take 1 Tab by mouth every six (6) hours as needed for Pain. 3. Encounter for smoking cessation counseling  -     nicotine (NICODERM CQ) 21 mg/24 hr; 1 Patch by TransDERmal route every twenty-four (24) hours for 30 days.      again advised pt I will not be starting her on narcotics as this is what she wants and asked the nurse after I left if \"is he giving me narcotics? \"  Follow-up and Dispositions    · Return if symptoms worsen or fail to improve. I have discussed the diagnosis with the patient and the intended plan as seen in the above orders. The patient has received an after-visit summary and questions were answered concerning future plans. Pt conveyed understanding of plan.     Medication Side Effects and Warnings were discussed with patient      [address] [facility], DO

## 2019-07-23 NOTE — PROGRESS NOTES
Román Daniels is a 44 y.o. female    Chief Complaint   Patient presents with    Medication Evaluation     Sulindac. pt states medication isn't helping w/pain     1. Have you been to the ER, urgent care clinic since your last visit? Hospitalized since your last visit? No    2. Have you seen or consulted any other health care providers outside of the [address] [place] [address] since your last visit? Include any pap smears or colon screening.  No    Visit Vitals  /72 (BP 1 Location: Left arm, BP Patient Position: Sitting)   Pulse 95   Temp 97.7 °F (36.5 °C) (Oral)   Resp 14   Ht 5' 7\" (1.702 m)   Wt 194 lb 12.8 oz (88.4 kg)   SpO2 92%   BMI 30.51 kg/m²

## 2019-07-23 NOTE — PATIENT INSTRUCTIONS
A Healthy Lifestyle: Care Instructions  Your Care Instructions    A healthy lifestyle can help you feel good, stay at a healthy weight, and have plenty of energy for both work and play. A healthy lifestyle is something you can share with your whole family. A healthy lifestyle also can lower your risk for serious health problems, such as high blood pressure, heart disease, and diabetes. You can follow a few steps listed below to improve your health and the health of your family. Follow-up care is a key part of your treatment and safety. Be sure to make and go to all appointments, and call your doctor if you are having problems. It's also a good idea to know your test results and keep a list of the medicines you take. How can you care for yourself at home? · Do not eat too much sugar, fat, or fast foods. You can still have dessert and treats now and then. The goal is moderation. · Start small to improve your eating habits. Pay attention to portion sizes, drink less juice and soda pop, and eat more fruits and vegetables. ? Eat a healthy amount of food. A 3-ounce serving of meat, for example, is about the size of a deck of cards. Fill the rest of your plate with vegetables and whole grains. ? Limit the amount of soda and sports drinks you have every day. Drink more water when you are thirsty. ? Eat at least 5 servings of fruits and vegetables every day. It may seem like a lot, but it is not hard to reach this goal. A serving or helping is 1 piece of fruit, 1 cup of vegetables, or 2 cups of leafy, raw vegetables. Have an apple or some carrot sticks as an afternoon snack instead of a candy bar. Try to have fruits and/or vegetables at every meal.  · Make exercise part of your daily routine. You may want to start with simple activities, such as walking, bicycling, or slow swimming. Try to be active 30 to 60 minutes every day. You do not need to do all 30 to 60 minutes all at once.  For example, you can exercise 3 times a day for 10 or 20 minutes. Moderate exercise is safe for most people, but it is always a good idea to talk to your doctor before starting an exercise program.  · Keep moving. Arin Ballard the lawn, work in the garden, or [place]. Take the stairs instead of the elevator at work. · If you smoke, quit. People who smoke have an increased risk for heart attack, stroke, cancer, and other lung illnesses. Quitting is hard, but there are ways to boost your chance of quitting tobacco for good. ? Use nicotine gum, patches, or lozenges. ? Ask your doctor about stop-smoking programs and medicines. ? Keep trying. In addition to reducing your risk of diseases in the future, you will notice some benefits soon after you stop using tobacco. If you have shortness of breath or asthma symptoms, they will likely get better within a few weeks after you quit. · Limit how much alcohol you drink. Moderate amounts of alcohol (up to 2 drinks a day for men, 1 drink a day for women) are okay. But drinking too much can lead to liver problems, high blood pressure, and other health problems. Family health  If you have a family, there are many things you can do together to improve your health. · Eat meals together as a family as often as possible. · Eat healthy foods. This includes fruits, vegetables, lean meats and dairy, and whole grains. · Include your family in your fitness plan. Most people think of activities such as jogging or tennis as the way to fitness, but there are many ways you and your family can be more active. Anything that makes you breathe hard and gets your heart pumping is exercise. Here are some tips:  ? Walk to do errands or to take your child to school or the bus.  ? Go for a family bike ride after dinner instead of watching TV. Where can you learn more? Go to http://ilia-miguel angel.info/. Enter V774 in the search box to learn more about \"A Healthy Lifestyle: Care Instructions. \"  Current as of: September 11, 2018  Content Version: 12.1  © 2896-2956 [employer]. Care instructions adapted under license by [employer] (which disclaims liability or warranty for this information). If you have questions about a medical condition or this instruction, always ask your healthcare professional. [address] any warranty or liability for your use of this information.

## 2019-07-24 LAB
ALBUMIN SERPL-MCNC: 4.1 G/DL (ref 3.5–5.5)
BUN SERPL-MCNC: 17 MG/DL (ref 6–24)
BUN/CREAT SERPL: 18 (ref 9–23)
CALCIUM SERPL-MCNC: 9.8 MG/DL (ref 8.7–10.2)
CHLORIDE SERPL-SCNC: 105 MMOL/L (ref 96–106)
CO2 SERPL-SCNC: 20 MMOL/L (ref 20–29)
CREAT SERPL-MCNC: 0.93 MG/DL (ref 0.57–1)
GLUCOSE SERPL-MCNC: 114 MG/DL (ref 65–99)
PHOSPHATE SERPL-MCNC: 3.4 MG/DL (ref 2.5–4.5)
POTASSIUM SERPL-SCNC: 4.4 MMOL/L (ref 3.5–5.2)
SODIUM SERPL-SCNC: 138 MMOL/L (ref 134–144)

## 2019-09-19 DIAGNOSIS — E61.1 IRON DEFICIENCY: ICD-10-CM

## 2019-09-23 ENCOUNTER — TELEPHONE (OUTPATIENT)
Dept: FAMILY MEDICINE CLINIC | Age: 49
End: 2019-09-23

## 2019-09-23 RX ORDER — LANOLIN ALCOHOL/MO/W.PET/CERES
CREAM (GRAM) TOPICAL
Qty: 30 TAB | Refills: 0 | OUTPATIENT
Start: 2019-09-23

## 2019-09-23 RX ORDER — SERTRALINE HYDROCHLORIDE 25 MG/1
TABLET, FILM COATED ORAL
Qty: 90 TAB | Refills: 3 | Status: SHIPPED | OUTPATIENT
Start: 2019-09-23

## 2019-09-23 NOTE — TELEPHONE ENCOUNTER
Called and spoke with patient. Informed her that she is no longer anemic. Stated that her labs in 2017 showed that she was anemic however all of her labs after that her iron has been normal. She states she was anemic prior to that. Informed her we don't have record of a this diagnosis prior. Per Dr. Zhang [place] note on those labs this was a new diagnosis. She again argued that she has been on iron for years and that she knows her history and how dare we try to tell her what her history is. I informed her that per Dr. Maricruz Mueller she no longer needs to be on this and can take an OTC prenatal vitamin with iron in it. Patient then stated how dare we suggest an OTC vitamin and that we are trying to kill her. He states she is anemic and needs her iron. I again informed her she is no longer anemic per her lab. Patient stated that is not true and hung up on me.

## 2019-09-23 NOTE — TELEPHONE ENCOUNTER
Called and spoke with patient and informed her that this is not needed. Patient became argumentative and states that she has been on iron for years and does not know why she all of a sudden does not need it. She states why would her labs come back saying that she needs it and all of a sudden Dr. Allan Brennan states that she does not need it. I informed her last time her iron was checked it was normal. Patient than asked about her sertraline. Informed her we did not get a request for that but that I will sent request to Dr. Allan Brennan. She than argued that the pharmacy sent the request. I again stated I would send the request to Dr. Allan Brennan.

## 2019-11-01 RX ORDER — DICLOFENAC SODIUM 10 MG/G
GEL TOPICAL
Qty: 100 G | Refills: 0 | Status: SHIPPED | OUTPATIENT
Start: 2019-11-01

## 2019-11-01 RX ORDER — MIRTAZAPINE 7.5 MG/1
TABLET, FILM COATED ORAL
Qty: 90 TAB | Refills: 0 | Status: SHIPPED | OUTPATIENT
Start: 2019-11-01

## 2020-07-07 RX ORDER — QUETIAPINE FUMARATE 100 MG/1
TABLET, FILM COATED ORAL
Qty: 90 TAB | Refills: 0 | Status: SHIPPED | OUTPATIENT
Start: 2020-07-07

## 2020-08-05 DIAGNOSIS — F31.9 BIPOLAR DEPRESSION (HCC): Chronic | ICD-10-CM

## 2020-08-06 RX ORDER — CALCITRIOL 0.5 UG/1
CAPSULE ORAL
Qty: 90 CAP | Refills: 0 | Status: SHIPPED | OUTPATIENT
Start: 2020-08-06

## 2020-08-06 RX ORDER — OMEPRAZOLE 20 MG/1
CAPSULE, DELAYED RELEASE ORAL
Qty: 90 CAP | Refills: 0 | Status: SHIPPED | OUTPATIENT
Start: 2020-08-06

## 2023-05-19 RX ORDER — QUETIAPINE FUMARATE 200 MG/1
TABLET, FILM COATED ORAL
COMMUNITY
Start: 2019-06-19

## 2023-05-19 RX ORDER — SERTRALINE HYDROCHLORIDE 25 MG/1
TABLET, FILM COATED ORAL
COMMUNITY
Start: 2019-09-23

## 2023-05-19 RX ORDER — TOPIRAMATE 25 MG/1
TABLET ORAL
COMMUNITY
Start: 2019-06-19

## 2023-05-19 RX ORDER — KETOROLAC TROMETHAMINE 10 MG/1
10 TABLET, FILM COATED ORAL EVERY 6 HOURS PRN
COMMUNITY
Start: 2019-07-23

## 2023-05-19 RX ORDER — BUSPIRONE HYDROCHLORIDE 30 MG/1
15 TABLET ORAL 3 TIMES DAILY
COMMUNITY
Start: 2019-06-19

## 2023-05-19 RX ORDER — TRAZODONE HYDROCHLORIDE 50 MG/1
50 TABLET ORAL
COMMUNITY
Start: 2019-06-19

## 2023-05-19 RX ORDER — HYDROXYZINE PAMOATE 50 MG/1
CAPSULE ORAL
COMMUNITY
Start: 2019-01-22

## 2023-05-19 RX ORDER — QUETIAPINE FUMARATE 100 MG/1
1 TABLET, FILM COATED ORAL NIGHTLY
COMMUNITY
Start: 2020-07-07

## 2023-05-19 RX ORDER — FERROUS SULFATE 325(65) MG
325 TABLET ORAL
COMMUNITY
Start: 2018-07-31

## 2023-05-19 RX ORDER — TIZANIDINE 4 MG/1
4 TABLET ORAL 3 TIMES DAILY PRN
COMMUNITY
Start: 2019-06-19

## 2023-05-19 RX ORDER — CALCITRIOL 0.5 UG/1
1 CAPSULE, LIQUID FILLED ORAL DAILY
COMMUNITY
Start: 2020-08-06

## 2023-05-19 RX ORDER — MIRTAZAPINE 7.5 MG/1
TABLET, FILM COATED ORAL
COMMUNITY
Start: 2019-11-01

## 2023-05-19 RX ORDER — OMEPRAZOLE 20 MG/1
1 CAPSULE, DELAYED RELEASE ORAL DAILY
COMMUNITY
Start: 2020-08-06